# Patient Record
Sex: FEMALE | Race: WHITE | NOT HISPANIC OR LATINO | Employment: OTHER | URBAN - NONMETROPOLITAN AREA
[De-identification: names, ages, dates, MRNs, and addresses within clinical notes are randomized per-mention and may not be internally consistent; named-entity substitution may affect disease eponyms.]

---

## 2017-02-03 DIAGNOSIS — I48.0 PAF (PAROXYSMAL ATRIAL FIBRILLATION) (HCC): ICD-10-CM

## 2017-03-10 ENCOUNTER — OFFICE VISIT (OUTPATIENT)
Dept: CARDIOLOGY | Facility: CLINIC | Age: 62
End: 2017-03-10
Payer: COMMERCIAL

## 2017-03-10 VITALS
HEIGHT: 62 IN | WEIGHT: 137 LBS | HEART RATE: 70 BPM | SYSTOLIC BLOOD PRESSURE: 120 MMHG | BODY MASS INDEX: 25.21 KG/M2 | DIASTOLIC BLOOD PRESSURE: 70 MMHG

## 2017-03-10 DIAGNOSIS — E78.2 MIXED HYPERLIPIDEMIA: ICD-10-CM

## 2017-03-10 DIAGNOSIS — I48.92 PAROXYSMAL ATRIAL FLUTTER (HCC): ICD-10-CM

## 2017-03-10 DIAGNOSIS — I49.5 SICK SINUS SYNDROME (HCC): ICD-10-CM

## 2017-03-10 DIAGNOSIS — I27.20 PULMONARY HYPERTENSION (HCC): ICD-10-CM

## 2017-03-10 DIAGNOSIS — I48.92 ATRIAL FLUTTER, UNSPECIFIED TYPE (HCC): ICD-10-CM

## 2017-03-10 DIAGNOSIS — R07.2 PRECORDIAL CHEST PAIN: ICD-10-CM

## 2017-03-10 DIAGNOSIS — I49.5 TACHY-BRADY SYNDROME (HCC): ICD-10-CM

## 2017-03-10 DIAGNOSIS — Z95.0 CARDIAC PACEMAKER IN SITU: ICD-10-CM

## 2017-03-10 PROCEDURE — 99214 OFFICE O/P EST MOD 30 MIN: CPT | Performed by: INTERNAL MEDICINE

## 2017-03-10 ASSESSMENT — ENCOUNTER SYMPTOMS
COUGH: 0
GASTROINTESTINAL NEGATIVE: 1
SORE THROAT: 0
MUSCULOSKELETAL NEGATIVE: 1
SPUTUM PRODUCTION: 0
CARDIOVASCULAR NEGATIVE: 1
RESPIRATORY NEGATIVE: 1
PND: 0
NEUROLOGICAL NEGATIVE: 1
PALPITATIONS: 0
EYES NEGATIVE: 1
SHORTNESS OF BREATH: 0
WEAKNESS: 0
CHILLS: 0
FEVER: 0
WHEEZING: 0
BRUISES/BLEEDS EASILY: 0
STRIDOR: 0
CLAUDICATION: 0
ORTHOPNEA: 0
CONSTITUTIONAL NEGATIVE: 1
LOSS OF CONSCIOUSNESS: 0
DIZZINESS: 0
HEMOPTYSIS: 0

## 2017-03-10 NOTE — Clinical Note
[facility]   [address] [place] [address]  Phone: [phone]  Fax: [phone]              Feli Vuong  1955    Encounter Date: 3/10/2017    Alton Vogt M.D.          PROGRESS NOTE:  Subjective:   Feli Vuong is a 61 y.o. female who presents today for her atrial fibrillation.  Since her last visit, she has been doing well.  Her last PM interrogation was normal.  She has not been getting any SOB, palpitaions or chest pain.    Past Medical History   Diagnosis Date   • Sick sinus syndrome (CMS-[facility]) 2/2/2013   • Tachy-kamran syndrome (CMS-[facility]) 2/2/2013   • Diabetes mellitus type II 4/19/2013   • Bipolar affective (CMS-[facility]) 4/19/2013   • Chronic pain 4/19/2013   • Precordial chest pain 7/15/2013   • Cardiac pacemaker in situ 7/15/2013   • Atrial flutter (CMS-[facility]) 7/15/2013   • Pulmonary hypertension (CMS-[facility]) 5/16/2014     Mild on echo of 4/2014      Past Surgical History   Procedure Laterality Date   • Pacemaker insertion     • Hysterectomy, vaginal     • Bladder suspension       Family History   Problem Relation Age of Onset   • Heart Disease Mother    • Heart Attack Mother      History   Smoking status   • Never Smoker    Smokeless tobacco   • Never Used     Allergies   Allergen Reactions   • Septra [Bactrim]    • Tape    • Tegretol [Carbamazepine]    • Digoxin Vomiting     When taken with antibiotics       Outpatient Encounter Prescriptions as of 3/10/2017   Medication Sig Dispense Refill   • rivaroxaban (XARELTO) 20 MG Tab tablet Take 1 Tab by mouth with dinner. 30 Tab 11   • diltiazem (DILACOR XR) 180 MG XR capsule Take 1 Cap by mouth every day. 30 Cap 6   • flecainide (TAMBOCOR) 100 MG Tab Take 1 Tab by mouth 2 times a day. 180 Tab 3   • lisinopril (PRINIVIL) 5 MG Tab Take 0.5 Tabs by mouth every day. 45 Tab 3   • atorvastatin (LIPITOR) 40 MG TABS Take 40 mg by mouth every evening.     • hydrocodone-acetaminophen (NORCO) 5-325 MG TABS  "per tablet Take 1-2 Tabs by mouth every four hours as needed.     • albuterol (VENTOLIN OR PROVENTIL) 108 (90 BASE) MCG/ACT AERS inhalation aerosol Inhale 2 Puffs by mouth every 6 hours as needed for Shortness of Breath.     • tramadol (ULTRAM) 50 MG TABS Take  mg by mouth 3 times a day.     • Fluticasone Furoate (VERAMYST NA) Spray 2 Puffs in nose every day.     • Polyethylene Glycol 3350 (MIRALAX PO) Take  by mouth every day.     • digoxin (LANOXIN) 0.25 MG TABS Take 250 mcg by mouth every day.     • gabapentin (NEURONTIN) 800 MG tablet Take 800 mg by mouth 4 times a day.     • docusate calcium (SURFAK) 240 MG CAPS Take 240 mg by mouth 2 times a day.       • estradiol (ESTRACE) 1 MG TABS Take 1 mg by mouth every day. Dose not listed. Frequency is 23 duration      • clonazepam (KLONOPIN) 1 MG TABS Take 0.5 mg by mouth 3 times a day.       • insulin glargine (LANTUS) 100 UNIT/ML SOLN Inject  as instructed every evening.       • metformin (GLUCOPHAGE) 500 MG TABS Take 500 mg by mouth 2 times a day, with meals. Pt doesn't remember dosage. \" it doesn't work anyway'      • risperidone (RISPERDAL) 0.5 MG TABS Take 0.25 mg by mouth 2 times a day.         No facility-administered encounter medications on file as of 3/10/2017.     Review of Systems   Constitutional: Negative.  Negative for fever, chills and malaise/fatigue.   HENT: Negative.  Negative for sore throat.    Eyes: Negative.    Respiratory: Negative.  Negative for cough, hemoptysis, sputum production, shortness of breath, wheezing and stridor.    Cardiovascular: Negative.  Negative for chest pain, palpitations, orthopnea, claudication, leg swelling and PND.   Gastrointestinal: Negative.    Genitourinary: Negative.    Musculoskeletal: Negative.    Skin: Negative.    Neurological: Negative.  Negative for dizziness, loss of consciousness and weakness.   Endo/Heme/Allergies: Negative.  Does not bruise/bleed easily.   All other systems reviewed and are " "negative.       Objective:   /70 mmHg  Pulse 70  Ht 1.575 m (5' 2\")  Wt 62.143 kg (137 lb)  BMI 25.05 kg/m2    Physical Exam   Constitutional: She is oriented to person, place, and time. She appears well-developed and well-nourished. No distress.   HENT:   Head: Normocephalic.   Mouth/Throat: Oropharynx is clear and moist.   Eyes: EOM are normal. Pupils are equal, round, and reactive to light. Right eye exhibits no discharge. Left eye exhibits no discharge. No scleral icterus.   Neck: Normal range of motion. Neck supple. No JVD present. No tracheal deviation present.   Cardiovascular: Normal rate, regular rhythm, S1 normal, S2 normal, normal heart sounds, intact distal pulses and normal pulses.  Exam reveals no gallop, no S3, no S4 and no friction rub.    No murmur heard.   No systolic murmur is present    No diastolic murmur is present   Pulses:       Carotid pulses are 2+ on the right side, and 2+ on the left side.       Radial pulses are 2+ on the right side, and 2+ on the left side.        Dorsalis pedis pulses are 2+ on the right side, and 2+ on the left side.        Posterior tibial pulses are 2+ on the right side, and 2+ on the left side.   Pulmonary/Chest: Effort normal and breath sounds normal. No respiratory distress. She has no wheezes. She has no rales.   Abdominal: Soft. Bowel sounds are normal. She exhibits no distension and no mass. There is no tenderness. There is no rebound and no guarding.   Musculoskeletal: She exhibits no edema.   Neurological: She is alert and oriented to person, place, and time. No cranial nerve deficit.   Skin: Skin is warm and dry. She is not diaphoretic. No pallor.   Psychiatric: She has a normal mood and affect. Her behavior is normal. Judgment and thought content normal.   Nursing note and vitals reviewed.      Assessment:     1. Sick sinus syndrome (CMS-HCC)     2. Tachy-kamran syndrome (CMS-HCC)     3. Precordial chest pain     4. Cardiac pacemaker in situ     "   5. Atrial flutter, unspecified type (CMS-HCC)     6. Pulmonary hypertension (CMS-HCC)     7. Mixed hyperlipidemia     8. Paroxysmal atrial flutter (CMS-HCC)         Medical Decision Making:  Today's Assessment / Status / Plan:     61 y/o F with atrial fibrillation, low burden, asymptomatic.  She continues to do well.  No changes today.  I will see her back in  6 months.    1. A-fib, paroxysmal    - low burden    - cont rivaroxaban 20    - cont dilt     - cont flecainde 100 bid    - cont dig 0.25       2. HTN    - cont lisinopril 2.5    3. HLD    - cont atorva 40    Thank for you allowing me to take part in your patient's care, please call should you have any questions or would like to discuss this patient.      Camilo Fish M.D.  [address] [facility] [address]  VIA Facsimile: [phone]

## 2017-03-10 NOTE — PROGRESS NOTES
Subjective:   Feli Vuong is a 61 y.o. female who presents today for her atrial fibrillation.  Since her last visit, she has been doing well.  Her last PM interrogation was normal.  She has not been getting any SOB, palpitaions or chest pain.    Past Medical History   Diagnosis Date   • Sick sinus syndrome (CMS-[facility]) 2/2/2013   • Tachy-kamran syndrome (CMS-[facility]) 2/2/2013   • Diabetes mellitus type II 4/19/2013   • Bipolar affective (CMS-[facility]) 4/19/2013   • Chronic pain 4/19/2013   • Precordial chest pain 7/15/2013   • Cardiac pacemaker in situ 7/15/2013   • Atrial flutter (CMS-[facility]) 7/15/2013   • Pulmonary hypertension (CMS-HCC) 5/16/2014     Mild on echo of 4/2014      Past Surgical History   Procedure Laterality Date   • Pacemaker insertion     • Hysterectomy, vaginal     • Bladder suspension       Family History   Problem Relation Age of Onset   • Heart Disease Mother    • Heart Attack Mother      History   Smoking status   • Never Smoker    Smokeless tobacco   • Never Used     Allergies   Allergen Reactions   • Septra [Bactrim]    • Tape    • Tegretol [Carbamazepine]    • Digoxin Vomiting     When taken with antibiotics       Outpatient Encounter Prescriptions as of 3/10/2017   Medication Sig Dispense Refill   • rivaroxaban (XARELTO) 20 MG Tab tablet Take 1 Tab by mouth with dinner. 30 Tab 11   • diltiazem (DILACOR XR) 180 MG XR capsule Take 1 Cap by mouth every day. 30 Cap 6   • flecainide (TAMBOCOR) 100 MG Tab Take 1 Tab by mouth 2 times a day. 180 Tab 3   • lisinopril (PRINIVIL) 5 MG Tab Take 0.5 Tabs by mouth every day. 45 Tab 3   • atorvastatin (LIPITOR) 40 MG TABS Take 40 mg by mouth every evening.     • hydrocodone-acetaminophen (NORCO) 5-325 MG TABS per tablet Take 1-2 Tabs by mouth every four hours as needed.     • albuterol (VENTOLIN OR PROVENTIL) 108 (90 BASE) MCG/ACT AERS inhalation aerosol Inhale 2 Puffs by mouth every 6 hours as needed for Shortness of Breath.     • tramadol (ULTRAM) 50 MG TABS Take  " mg by mouth 3 times a day.     • Fluticasone Furoate (VERAMYST NA) Spray 2 Puffs in nose every day.     • Polyethylene Glycol 3350 (MIRALAX PO) Take  by mouth every day.     • digoxin (LANOXIN) 0.25 MG TABS Take 250 mcg by mouth every day.     • gabapentin (NEURONTIN) 800 MG tablet Take 800 mg by mouth 4 times a day.     • docusate calcium (SURFAK) 240 MG CAPS Take 240 mg by mouth 2 times a day.       • estradiol (ESTRACE) 1 MG TABS Take 1 mg by mouth every day. Dose not listed. Frequency is 23 duration      • clonazepam (KLONOPIN) 1 MG TABS Take 0.5 mg by mouth 3 times a day.       • insulin glargine (LANTUS) 100 UNIT/ML SOLN Inject  as instructed every evening.       • metformin (GLUCOPHAGE) 500 MG TABS Take 500 mg by mouth 2 times a day, with meals. Pt doesn't remember dosage. \" it doesn't work anyway'      • risperidone (RISPERDAL) 0.5 MG TABS Take 0.25 mg by mouth 2 times a day.         No facility-administered encounter medications on file as of 3/10/2017.     Review of Systems   Constitutional: Negative.  Negative for fever, chills and malaise/fatigue.   HENT: Negative.  Negative for sore throat.    Eyes: Negative.    Respiratory: Negative.  Negative for cough, hemoptysis, sputum production, shortness of breath, wheezing and stridor.    Cardiovascular: Negative.  Negative for chest pain, palpitations, orthopnea, claudication, leg swelling and PND.   Gastrointestinal: Negative.    Genitourinary: Negative.    Musculoskeletal: Negative.    Skin: Negative.    Neurological: Negative.  Negative for dizziness, loss of consciousness and weakness.   Endo/Heme/Allergies: Negative.  Does not bruise/bleed easily.   All other systems reviewed and are negative.       Objective:   /70 mmHg  Pulse 70  Ht 1.575 m (5' 2\")  Wt 62.143 kg (137 lb)  BMI 25.05 kg/m2    Physical Exam   Constitutional: She is oriented to person, place, and time. She appears well-developed and well-nourished. No distress.   HENT: "   Head: Normocephalic.   Mouth/Throat: Oropharynx is clear and moist.   Eyes: EOM are normal. Pupils are equal, round, and reactive to light. Right eye exhibits no discharge. Left eye exhibits no discharge. No scleral icterus.   Neck: Normal range of motion. Neck supple. No JVD present. No tracheal deviation present.   Cardiovascular: Normal rate, regular rhythm, S1 normal, S2 normal, normal heart sounds, intact distal pulses and normal pulses.  Exam reveals no gallop, no S3, no S4 and no friction rub.    No murmur heard.   No systolic murmur is present    No diastolic murmur is present   Pulses:       Carotid pulses are 2+ on the right side, and 2+ on the left side.       Radial pulses are 2+ on the right side, and 2+ on the left side.        Dorsalis pedis pulses are 2+ on the right side, and 2+ on the left side.        Posterior tibial pulses are 2+ on the right side, and 2+ on the left side.   Pulmonary/Chest: Effort normal and breath sounds normal. No respiratory distress. She has no wheezes. She has no rales.   Abdominal: Soft. Bowel sounds are normal. She exhibits no distension and no mass. There is no tenderness. There is no rebound and no guarding.   Musculoskeletal: She exhibits no edema.   Neurological: She is alert and oriented to person, place, and time. No cranial nerve deficit.   Skin: Skin is warm and dry. She is not diaphoretic. No pallor.   Psychiatric: She has a normal mood and affect. Her behavior is normal. Judgment and thought content normal.   Nursing note and vitals reviewed.      Assessment:     1. Sick sinus syndrome (CMS-HCC)     2. Tachy-kamran syndrome (CMS-HCC)     3. Precordial chest pain     4. Cardiac pacemaker in situ     5. Atrial flutter, unspecified type (CMS-HCC)     6. Pulmonary hypertension (CMS-HCC)     7. Mixed hyperlipidemia     8. Paroxysmal atrial flutter (CMS-HCC)         Medical Decision Making:  Today's Assessment / Status / Plan:     59 y/o F with atrial fibrillation,  low burden, asymptomatic.  She continues to do well.  No changes today.  I will see her back in  6 months.    1. A-fib, paroxysmal    - low burden    - cont rivaroxaban 20    - cont dilt     - cont flecainde 100 bid    - cont dig 0.25       2. HTN    - cont lisinopril 2.5    3. HLD    - cont atorva 40    Thank for you allowing me to take part in your patient's care, please call should you have any questions or would like to discuss this patient.

## 2017-03-31 DIAGNOSIS — I48.0 PAF (PAROXYSMAL ATRIAL FIBRILLATION) (HCC): ICD-10-CM

## 2017-03-31 RX ORDER — DILTIAZEM HYDROCHLORIDE 180 MG/1
180 CAPSULE, EXTENDED RELEASE ORAL DAILY
Qty: 30 CAP | Refills: 6 | OUTPATIENT
Start: 2017-03-31 | End: 2017-10-09 | Stop reason: SDUPTHER

## 2017-05-26 ENCOUNTER — DEVICE CHECK (OUTPATIENT)
Dept: CARDIOLOGY | Facility: MEDICAL CENTER | Age: 62
End: 2017-05-26

## 2017-06-26 DIAGNOSIS — I10 ESSENTIAL HYPERTENSION, BENIGN: ICD-10-CM

## 2017-06-26 RX ORDER — LISINOPRIL 5 MG/1
2.5 TABLET ORAL DAILY
Qty: 45 TAB | Refills: 3 | Status: SHIPPED | OUTPATIENT
Start: 2017-06-26 | End: 2018-06-28 | Stop reason: SDUPTHER

## 2017-07-14 DIAGNOSIS — I49.5 TACHY-BRADY SYNDROME (HCC): ICD-10-CM

## 2017-07-14 RX ORDER — FLECAINIDE ACETATE 100 MG/1
100 TABLET ORAL 2 TIMES DAILY
Qty: 180 TAB | Refills: 3 | Status: SHIPPED | OUTPATIENT
Start: 2017-07-14 | End: 2018-07-26 | Stop reason: SDUPTHER

## 2017-09-22 ENCOUNTER — OFFICE VISIT (OUTPATIENT)
Dept: CARDIOLOGY | Facility: CLINIC | Age: 62
End: 2017-09-22
Payer: COMMERCIAL

## 2017-09-22 VITALS
HEIGHT: 62 IN | HEART RATE: 74 BPM | WEIGHT: 133 LBS | BODY MASS INDEX: 24.48 KG/M2 | SYSTOLIC BLOOD PRESSURE: 124 MMHG | DIASTOLIC BLOOD PRESSURE: 72 MMHG

## 2017-09-22 DIAGNOSIS — I48.92 ATRIAL FLUTTER, UNSPECIFIED TYPE (HCC): ICD-10-CM

## 2017-09-22 DIAGNOSIS — I34.0 MILD MITRAL REGURGITATION: ICD-10-CM

## 2017-09-22 DIAGNOSIS — I35.8 AORTIC VALVE SCLEROSIS: ICD-10-CM

## 2017-09-22 DIAGNOSIS — E78.2 MIXED HYPERLIPIDEMIA: ICD-10-CM

## 2017-09-22 DIAGNOSIS — I49.5 SICK SINUS SYNDROME (HCC): ICD-10-CM

## 2017-09-22 DIAGNOSIS — Z95.0 CARDIAC PACEMAKER IN SITU: ICD-10-CM

## 2017-09-22 DIAGNOSIS — I48.92 PAROXYSMAL ATRIAL FLUTTER (HCC): ICD-10-CM

## 2017-09-22 DIAGNOSIS — I49.5 TACHY-BRADY SYNDROME (HCC): ICD-10-CM

## 2017-09-22 DIAGNOSIS — F31.70 BIPOLAR DISORDER IN PARTIAL REMISSION, MOST RECENT EPISODE UNSPECIFIED TYPE (HCC): ICD-10-CM

## 2017-09-22 DIAGNOSIS — I27.20 PULMONARY HYPERTENSION (HCC): ICD-10-CM

## 2017-09-22 PROCEDURE — 99214 OFFICE O/P EST MOD 30 MIN: CPT | Performed by: INTERNAL MEDICINE

## 2017-09-22 NOTE — LETTER
[facility] -    [address] [place] [address]  Phone: [phone]  Fax: [phone]              Feli Vuong  1955    Encounter Date: 9/22/2017    Alton Vogt M.D.          PROGRESS NOTE:  No notes on file      [facility]ipa Vera [facility]  [address] [place] [address]  VIA Facsimile: [phone]

## 2017-09-24 ASSESSMENT — ENCOUNTER SYMPTOMS
COUGH: 0
PND: 0
RESPIRATORY NEGATIVE: 1
CARDIOVASCULAR NEGATIVE: 1
SPUTUM PRODUCTION: 0
STRIDOR: 0
GASTROINTESTINAL NEGATIVE: 1
PALPITATIONS: 0
SORE THROAT: 0
HEMOPTYSIS: 0
CHILLS: 0
DIZZINESS: 0
BRUISES/BLEEDS EASILY: 0
WHEEZING: 0
CLAUDICATION: 0
NEUROLOGICAL NEGATIVE: 1
MUSCULOSKELETAL NEGATIVE: 1
CONSTITUTIONAL NEGATIVE: 1
ORTHOPNEA: 0
SHORTNESS OF BREATH: 0
LOSS OF CONSCIOUSNESS: 0
EYES NEGATIVE: 1
FEVER: 0
WEAKNESS: 0

## 2017-09-25 NOTE — PROGRESS NOTES
Subjective:   Feli Vuong is a 61 y.o. female who presents today as a follow up for her paroxysmal atrial fibrillation and her PPM.  She continues to do well with no symptoms.  A-fib is rare.  She is trying to be active.    Past Medical History:   Diagnosis Date   • Pulmonary hypertension (CMS-[facility]) 5/16/2014    Mild on echo of 4/2014    • Precordial chest pain 7/15/2013   • Cardiac pacemaker in situ 7/15/2013   • Atrial flutter (CMS-[facility]) 7/15/2013   • Diabetes mellitus type II 4/19/2013   • Bipolar affective (CMS-[facility]) 4/19/2013   • Chronic pain 4/19/2013   • Sick sinus syndrome (CMS-[facility]) 2/2/2013   • Tachy-kamran syndrome (CMS-[facility]) 2/2/2013     Past Surgical History:   Procedure Laterality Date   • BLADDER SUSPENSION     • HYSTERECTOMY, VAGINAL     • PACEMAKER INSERTION       Family History   Problem Relation Age of Onset   • Heart Disease Mother    • Heart Attack Mother      History   Smoking Status   • Never Smoker   Smokeless Tobacco   • Never Used     Allergies   Allergen Reactions   • Septra [Bactrim]    • Tape    • Tegretol [Carbamazepine]    • Digoxin Vomiting     When taken with antibiotics       Outpatient Encounter Prescriptions as of 9/22/2017   Medication Sig Dispense Refill   • flecainide (TAMBOCOR) 100 MG Tab Take 1 Tab by mouth 2 times a day. 180 Tab 3   • lisinopril (PRINIVIL) 5 MG Tab Take 0.5 Tabs by mouth every day. 45 Tab 3   • diltiazem (DILACOR XR) 180 MG XR capsule Take 1 Cap by mouth every day. 30 Cap 6   • rivaroxaban (XARELTO) 20 MG Tab tablet Take 1 Tab by mouth with dinner. 30 Tab 11   • atorvastatin (LIPITOR) 40 MG TABS Take 40 mg by mouth every evening.     • hydrocodone-acetaminophen (NORCO) 5-325 MG TABS per tablet Take 1-2 Tabs by mouth every four hours as needed.     • albuterol (VENTOLIN OR PROVENTIL) 108 (90 BASE) MCG/ACT AERS inhalation aerosol Inhale 2 Puffs by mouth every 6 hours as needed for Shortness of Breath.     • tramadol (ULTRAM) 50 MG TABS Take  mg by mouth 3 times  "a day.     • Fluticasone Furoate (VERAMYST NA) Spray 2 Puffs in nose every day.     • Polyethylene Glycol 3350 (MIRALAX PO) Take  by mouth every day.     • digoxin (LANOXIN) 0.25 MG TABS Take 250 mcg by mouth every day.     • gabapentin (NEURONTIN) 800 MG tablet Take 800 mg by mouth 4 times a day.     • docusate calcium (SURFAK) 240 MG CAPS Take 240 mg by mouth 2 times a day.       • estradiol (ESTRACE) 1 MG TABS Take 1 mg by mouth every day. Dose not listed. Frequency is 23 duration      • clonazepam (KLONOPIN) 1 MG TABS Take 0.5 mg by mouth 3 times a day.       • insulin glargine (LANTUS) 100 UNIT/ML SOLN Inject  as instructed every evening.       • metformin (GLUCOPHAGE) 500 MG TABS Take 500 mg by mouth 2 times a day, with meals. Pt doesn't remember dosage. \" it doesn't work anyway'      • risperidone (RISPERDAL) 0.5 MG TABS Take 0.25 mg by mouth 2 times a day.         No facility-administered encounter medications on file as of 9/22/2017.      Review of Systems   Constitutional: Negative.  Negative for chills, fever and malaise/fatigue.   HENT: Negative.  Negative for sore throat.    Eyes: Negative.    Respiratory: Negative.  Negative for cough, hemoptysis, sputum production, shortness of breath, wheezing and stridor.    Cardiovascular: Negative.  Negative for chest pain, palpitations, orthopnea, claudication, leg swelling and PND.   Gastrointestinal: Negative.    Genitourinary: Negative.    Musculoskeletal: Negative.    Skin: Negative.    Neurological: Negative.  Negative for dizziness, loss of consciousness and weakness.   Endo/Heme/Allergies: Negative.  Does not bruise/bleed easily.   All other systems reviewed and are negative.       Objective:   /72   Pulse 74   Ht 1.575 m (5' 2\")   Wt 60.3 kg (133 lb)   BMI 24.33 kg/m²     Physical Exam   Constitutional: She is oriented to person, place, and time. She appears well-developed and well-nourished. No distress.   HENT:   Head: Normocephalic. "   Mouth/Throat: Oropharynx is clear and moist.   Eyes: EOM are normal. Pupils are equal, round, and reactive to light. Right eye exhibits no discharge. Left eye exhibits no discharge. No scleral icterus.   Neck: Normal range of motion. Neck supple. No JVD present. No tracheal deviation present.   Cardiovascular: Normal rate, regular rhythm, S1 normal, S2 normal, normal heart sounds, intact distal pulses and normal pulses.  Exam reveals no gallop, no S3, no S4 and no friction rub.    No murmur heard.   No systolic murmur is present    No diastolic murmur is present   Pulses:       Carotid pulses are 2+ on the right side, and 2+ on the left side.       Radial pulses are 2+ on the right side, and 2+ on the left side.        Dorsalis pedis pulses are 2+ on the right side, and 2+ on the left side.        Posterior tibial pulses are 2+ on the right side, and 2+ on the left side.   Pulmonary/Chest: Effort normal and breath sounds normal. No respiratory distress. She has no wheezes. She has no rales.   Abdominal: Soft. Bowel sounds are normal. She exhibits no distension and no mass. There is no tenderness. There is no rebound and no guarding.   Musculoskeletal: She exhibits no edema.   Neurological: She is alert and oriented to person, place, and time. No cranial nerve deficit.   Skin: Skin is warm and dry. She is not diaphoretic. No pallor.   Psychiatric: She has a normal mood and affect. Her behavior is normal. Judgment and thought content normal.   Nursing note and vitals reviewed.      Assessment:     1. Sick sinus syndrome (CMS-HCC)     2. Tachy-kamran syndrome (CMS-HCC)     3. Bipolar disorder in partial remission, most recent episode unspecified type (CMS-HCC)     4. Cardiac pacemaker in situ     5. Atrial flutter, unspecified type (CMS-HCC)     6. Aortic valve sclerosis     7. Mild mitral regurgitation     8. Pulmonary hypertension (CMS-HCC)     9. Mixed hyperlipidemia     10. Paroxysmal atrial flutter (CMS-HCC)          Medical Decision Making:  Today's Assessment / Status / Plan:     60 y/o F with paroxysmal atrial fibrillation s/p PPM, HTN and HLD.  She is doing well.  We will refill her medications today.  We will see her back in one year.    Thank for you allowing me to take part in your patient's care, please call should you have any questions or would like to discuss this patient.

## 2017-10-09 DIAGNOSIS — I48.0 PAF (PAROXYSMAL ATRIAL FIBRILLATION) (HCC): ICD-10-CM

## 2017-10-10 RX ORDER — DILTIAZEM HYDROCHLORIDE 180 MG/1
180 CAPSULE, EXTENDED RELEASE ORAL DAILY
Qty: 90 CAP | Refills: 3 | Status: SHIPPED | OUTPATIENT
Start: 2017-10-10 | End: 2022-08-26

## 2017-11-14 ENCOUNTER — TELEPHONE (OUTPATIENT)
Dept: CARDIOLOGY | Facility: MEDICAL CENTER | Age: 62
End: 2017-11-14

## 2018-02-16 DIAGNOSIS — I48.0 PAF (PAROXYSMAL ATRIAL FIBRILLATION) (HCC): ICD-10-CM

## 2018-02-16 RX ORDER — RIVAROXABAN 20 MG/1
TABLET, FILM COATED ORAL
Qty: 90 TAB | Refills: 2 | Status: SHIPPED | OUTPATIENT
Start: 2018-02-16 | End: 2018-12-07 | Stop reason: SDUPTHER

## 2018-06-28 DIAGNOSIS — I10 ESSENTIAL HYPERTENSION, BENIGN: ICD-10-CM

## 2018-06-28 RX ORDER — LISINOPRIL 5 MG/1
TABLET ORAL
Qty: 30 TAB | Refills: 5 | Status: SHIPPED | OUTPATIENT
Start: 2018-06-28 | End: 2019-01-04 | Stop reason: SDUPTHER

## 2018-07-05 DIAGNOSIS — I49.5 SICK SINUS SYNDROME (HCC): ICD-10-CM

## 2018-07-05 DIAGNOSIS — I49.5 TACHY-BRADY SYNDROME (HCC): ICD-10-CM

## 2018-07-05 DIAGNOSIS — I48.92 PAROXYSMAL ATRIAL FLUTTER (HCC): ICD-10-CM

## 2018-07-26 DIAGNOSIS — I49.5 TACHY-BRADY SYNDROME (HCC): ICD-10-CM

## 2018-07-26 RX ORDER — FLECAINIDE ACETATE 100 MG/1
TABLET ORAL
Qty: 180 TAB | Refills: 1 | Status: ON HOLD | OUTPATIENT
Start: 2018-07-26 | End: 2022-09-01

## 2018-07-31 ENCOUNTER — TELEPHONE (OUTPATIENT)
Dept: CARDIOLOGY | Facility: MEDICAL CENTER | Age: 63
End: 2018-07-31

## 2018-07-31 NOTE — TELEPHONE ENCOUNTER
Patient wants call back   Received: Today   Message Contents   JYOTI Vigil/Nellie     Patient wants a call back about a release form to the [facility] center and can be reached at [phone].     Attempted to contact patient, patient picked up, bad connection, phone cutting in and out.  Call ended.     2nd attempt to contact patient, bad connection, patient ended phone call.    3rd attempt, no answer.  Went to .   not set up yet.  Unable to leave message.

## 2018-11-09 DIAGNOSIS — I49.5 TACHY-BRADY SYNDROME (HCC): Primary | ICD-10-CM

## 2018-11-11 RX ORDER — DILTIAZEM HYDROCHLORIDE 180 MG/1
CAPSULE, EXTENDED RELEASE ORAL
Qty: 90 CAP | Refills: 0 | Status: SHIPPED | OUTPATIENT
Start: 2018-11-11 | End: 2022-08-26

## 2018-12-07 DIAGNOSIS — I48.0 PAF (PAROXYSMAL ATRIAL FIBRILLATION) (HCC): ICD-10-CM

## 2018-12-07 RX ORDER — RIVAROXABAN 20 MG/1
TABLET, FILM COATED ORAL
Qty: 90 TAB | Refills: 2 | Status: SHIPPED | OUTPATIENT
Start: 2018-12-07

## 2019-01-04 DIAGNOSIS — I10 ESSENTIAL HYPERTENSION, BENIGN: ICD-10-CM

## 2019-01-04 RX ORDER — LISINOPRIL 5 MG/1
2.5 TABLET ORAL DAILY
Qty: 15 TAB | Refills: 0 | Status: SHIPPED | OUTPATIENT
Start: 2019-01-04 | End: 2022-08-26

## 2022-08-26 ENCOUNTER — HOSPITAL ENCOUNTER (OUTPATIENT)
Dept: RADIOLOGY | Facility: MEDICAL CENTER | Age: 67
End: 2022-08-26

## 2022-08-26 ENCOUNTER — TELEPHONE (OUTPATIENT)
Dept: CARDIOLOGY | Facility: MEDICAL CENTER | Age: 67
End: 2022-08-26
Payer: MEDICARE

## 2022-08-26 ENCOUNTER — APPOINTMENT (OUTPATIENT)
Dept: RADIOLOGY | Facility: MEDICAL CENTER | Age: 67
DRG: 871 | End: 2022-08-26
Attending: EMERGENCY MEDICINE
Payer: MEDICARE

## 2022-08-26 ENCOUNTER — HOSPITAL ENCOUNTER (INPATIENT)
Facility: MEDICAL CENTER | Age: 67
LOS: 5 days | DRG: 871 | End: 2022-09-01
Attending: EMERGENCY MEDICINE | Admitting: STUDENT IN AN ORGANIZED HEALTH CARE EDUCATION/TRAINING PROGRAM
Payer: MEDICARE

## 2022-08-26 DIAGNOSIS — Z79.4 TYPE 2 DIABETES MELLITUS WITH HYPOGLYCEMIA WITHOUT COMA, WITH LONG-TERM CURRENT USE OF INSULIN (HCC): ICD-10-CM

## 2022-08-26 DIAGNOSIS — E11.649 TYPE 2 DIABETES MELLITUS WITH HYPOGLYCEMIA WITHOUT COMA, WITH LONG-TERM CURRENT USE OF INSULIN (HCC): ICD-10-CM

## 2022-08-26 DIAGNOSIS — G45.9 TIA (TRANSIENT ISCHEMIC ATTACK): ICD-10-CM

## 2022-08-26 DIAGNOSIS — E78.2 MIXED HYPERLIPIDEMIA: ICD-10-CM

## 2022-08-26 DIAGNOSIS — R41.82 ALTERED MENTAL STATUS, UNSPECIFIED ALTERED MENTAL STATUS TYPE: ICD-10-CM

## 2022-08-26 PROBLEM — D72.829 LEUKOCYTOSIS: Status: ACTIVE | Noted: 2022-08-26

## 2022-08-26 PROBLEM — R55 SYNCOPE: Status: ACTIVE | Noted: 2022-08-26

## 2022-08-26 PROBLEM — N17.9 AKI (ACUTE KIDNEY INJURY) (HCC): Status: ACTIVE | Noted: 2022-08-26

## 2022-08-26 PROBLEM — R07.9 PAIN IN THE CHEST: Status: ACTIVE | Noted: 2022-08-26

## 2022-08-26 PROBLEM — R79.89 ELEVATED TROPONIN: Status: ACTIVE | Noted: 2022-08-26

## 2022-08-26 PROBLEM — E87.6 HYPOKALEMIA: Status: ACTIVE | Noted: 2022-08-26

## 2022-08-26 LAB
ALBUMIN SERPL BCP-MCNC: 3.7 G/DL (ref 3.2–4.9)
ALBUMIN/GLOB SERPL: 1.1 G/DL
ALP SERPL-CCNC: 74 U/L (ref 30–99)
ALT SERPL-CCNC: 16 U/L (ref 2–50)
ANION GAP SERPL CALC-SCNC: 13 MMOL/L (ref 7–16)
APTT PPP: 26.4 SEC (ref 24.7–36)
AST SERPL-CCNC: 17 U/L (ref 12–45)
BASOPHILS # BLD AUTO: 0.2 % (ref 0–1.8)
BASOPHILS # BLD: 0.03 K/UL (ref 0–0.12)
BILIRUB SERPL-MCNC: 0.3 MG/DL (ref 0.1–1.5)
BUN SERPL-MCNC: 29 MG/DL (ref 8–22)
CALCIUM SERPL-MCNC: 10.1 MG/DL (ref 8.5–10.5)
CHLORIDE SERPL-SCNC: 108 MMOL/L (ref 96–112)
CO2 SERPL-SCNC: 18 MMOL/L (ref 20–33)
CREAT SERPL-MCNC: 1.46 MG/DL (ref 0.5–1.4)
EKG IMPRESSION: NORMAL
EOSINOPHIL # BLD AUTO: 0.07 K/UL (ref 0–0.51)
EOSINOPHIL NFR BLD: 0.5 % (ref 0–6.9)
ERYTHROCYTE [DISTWIDTH] IN BLOOD BY AUTOMATED COUNT: 39.8 FL (ref 35.9–50)
ETHANOL BLD-MCNC: <10.1 MG/DL
GFR SERPLBLD CREATININE-BSD FMLA CKD-EPI: 39 ML/MIN/1.73 M 2
GLOBULIN SER CALC-MCNC: 3.3 G/DL (ref 1.9–3.5)
GLUCOSE SERPL-MCNC: 112 MG/DL (ref 65–99)
HCT VFR BLD AUTO: 39.8 % (ref 37–47)
HGB BLD-MCNC: 13.9 G/DL (ref 12–16)
IMM GRANULOCYTES # BLD AUTO: 0.05 K/UL (ref 0–0.11)
IMM GRANULOCYTES NFR BLD AUTO: 0.4 % (ref 0–0.9)
INR PPP: 1 (ref 0.87–1.13)
LIPASE SERPL-CCNC: 19 U/L (ref 11–82)
LYMPHOCYTES # BLD AUTO: 2.52 K/UL (ref 1–4.8)
LYMPHOCYTES NFR BLD: 19.3 % (ref 22–41)
MAGNESIUM SERPL-MCNC: 1.7 MG/DL (ref 1.5–2.5)
MCH RBC QN AUTO: 30 PG (ref 27–33)
MCHC RBC AUTO-ENTMCNC: 34.9 G/DL (ref 33.6–35)
MCV RBC AUTO: 85.8 FL (ref 81.4–97.8)
MONOCYTES # BLD AUTO: 0.64 K/UL (ref 0–0.85)
MONOCYTES NFR BLD AUTO: 4.9 % (ref 0–13.4)
NEUTROPHILS # BLD AUTO: 9.75 K/UL (ref 2–7.15)
NEUTROPHILS NFR BLD: 74.7 % (ref 44–72)
NRBC # BLD AUTO: 0 K/UL
NRBC BLD-RTO: 0 /100 WBC
NT-PROBNP SERPL IA-MCNC: 787 PG/ML (ref 0–125)
PHOSPHATE SERPL-MCNC: 3.7 MG/DL (ref 2.5–4.5)
PLATELET # BLD AUTO: 362 K/UL (ref 164–446)
PMV BLD AUTO: 9.3 FL (ref 9–12.9)
POTASSIUM SERPL-SCNC: 3.2 MMOL/L (ref 3.6–5.5)
PROCALCITONIN SERPL-MCNC: 0.23 NG/ML
PROT SERPL-MCNC: 7 G/DL (ref 6–8.2)
PROTHROMBIN TIME: 13.1 SEC (ref 12–14.6)
RBC # BLD AUTO: 4.64 M/UL (ref 4.2–5.4)
SODIUM SERPL-SCNC: 139 MMOL/L (ref 135–145)
TROPONIN T SERPL-MCNC: 21 NG/L (ref 6–19)
TROPONIN T SERPL-MCNC: 22 NG/L (ref 6–19)
WBC # BLD AUTO: 13.1 K/UL (ref 4.8–10.8)

## 2022-08-26 PROCEDURE — 84145 PROCALCITONIN (PCT): CPT

## 2022-08-26 PROCEDURE — 700105 HCHG RX REV CODE 258: Performed by: STUDENT IN AN ORGANIZED HEALTH CARE EDUCATION/TRAINING PROGRAM

## 2022-08-26 PROCEDURE — 99285 EMERGENCY DEPT VISIT HI MDM: CPT

## 2022-08-26 PROCEDURE — 80053 COMPREHEN METABOLIC PANEL: CPT

## 2022-08-26 PROCEDURE — 99220 PR INITIAL OBSERVATION CARE,LEVL III: CPT | Performed by: STUDENT IN AN ORGANIZED HEALTH CARE EDUCATION/TRAINING PROGRAM

## 2022-08-26 PROCEDURE — 82077 ASSAY SPEC XCP UR&BREATH IA: CPT

## 2022-08-26 PROCEDURE — 84484 ASSAY OF TROPONIN QUANT: CPT | Mod: 91

## 2022-08-26 PROCEDURE — G0378 HOSPITAL OBSERVATION PER HR: HCPCS

## 2022-08-26 PROCEDURE — 84100 ASSAY OF PHOSPHORUS: CPT

## 2022-08-26 PROCEDURE — 85730 THROMBOPLASTIN TIME PARTIAL: CPT

## 2022-08-26 PROCEDURE — 36415 COLL VENOUS BLD VENIPUNCTURE: CPT

## 2022-08-26 PROCEDURE — 83690 ASSAY OF LIPASE: CPT

## 2022-08-26 PROCEDURE — 81001 URINALYSIS AUTO W/SCOPE: CPT

## 2022-08-26 PROCEDURE — 93005 ELECTROCARDIOGRAM TRACING: CPT | Performed by: EMERGENCY MEDICINE

## 2022-08-26 PROCEDURE — 85025 COMPLETE CBC W/AUTO DIFF WBC: CPT

## 2022-08-26 PROCEDURE — 99204 OFFICE O/P NEW MOD 45 MIN: CPT | Performed by: STUDENT IN AN ORGANIZED HEALTH CARE EDUCATION/TRAINING PROGRAM

## 2022-08-26 PROCEDURE — 85610 PROTHROMBIN TIME: CPT

## 2022-08-26 PROCEDURE — A9270 NON-COVERED ITEM OR SERVICE: HCPCS | Performed by: STUDENT IN AN ORGANIZED HEALTH CARE EDUCATION/TRAINING PROGRAM

## 2022-08-26 PROCEDURE — 83880 ASSAY OF NATRIURETIC PEPTIDE: CPT

## 2022-08-26 PROCEDURE — 93005 ELECTROCARDIOGRAM TRACING: CPT

## 2022-08-26 PROCEDURE — 700102 HCHG RX REV CODE 250 W/ 637 OVERRIDE(OP): Performed by: STUDENT IN AN ORGANIZED HEALTH CARE EDUCATION/TRAINING PROGRAM

## 2022-08-26 PROCEDURE — 71045 X-RAY EXAM CHEST 1 VIEW: CPT

## 2022-08-26 PROCEDURE — 83735 ASSAY OF MAGNESIUM: CPT

## 2022-08-26 RX ORDER — METOPROLOL SUCCINATE 100 MG/1
100 TABLET, EXTENDED RELEASE ORAL DAILY
COMMUNITY

## 2022-08-26 RX ORDER — QUETIAPINE FUMARATE 25 MG/1
25 TABLET, FILM COATED ORAL 2 TIMES DAILY
Status: ON HOLD | COMMUNITY
End: 2022-09-01

## 2022-08-26 RX ORDER — RISPERIDONE 1 MG/1
1 TABLET ORAL
Status: ON HOLD | COMMUNITY
End: 2022-09-01

## 2022-08-26 RX ORDER — SODIUM CHLORIDE, SODIUM LACTATE, POTASSIUM CHLORIDE, AND CALCIUM CHLORIDE .6; .31; .03; .02 G/100ML; G/100ML; G/100ML; G/100ML
1000 INJECTION, SOLUTION INTRAVENOUS ONCE
Status: COMPLETED | OUTPATIENT
Start: 2022-08-27 | End: 2022-08-27

## 2022-08-26 RX ORDER — SODIUM CHLORIDE, SODIUM LACTATE, POTASSIUM CHLORIDE, CALCIUM CHLORIDE 600; 310; 30; 20 MG/100ML; MG/100ML; MG/100ML; MG/100ML
INJECTION, SOLUTION INTRAVENOUS CONTINUOUS
Status: DISCONTINUED | OUTPATIENT
Start: 2022-08-26 | End: 2022-08-27

## 2022-08-26 RX ORDER — AMOXICILLIN 250 MG
2 CAPSULE ORAL 2 TIMES DAILY
Status: DISCONTINUED | OUTPATIENT
Start: 2022-08-26 | End: 2022-09-01 | Stop reason: HOSPADM

## 2022-08-26 RX ORDER — METFORMIN HYDROCHLORIDE 500 MG/1
1000 TABLET, EXTENDED RELEASE ORAL 2 TIMES DAILY
COMMUNITY

## 2022-08-26 RX ORDER — ACETAMINOPHEN 325 MG/1
650 TABLET ORAL EVERY 6 HOURS PRN
Status: DISCONTINUED | OUTPATIENT
Start: 2022-08-26 | End: 2022-09-01 | Stop reason: HOSPADM

## 2022-08-26 RX ORDER — MIRTAZAPINE 15 MG/1
7.5 TABLET, FILM COATED ORAL NIGHTLY
Status: ON HOLD | COMMUNITY
End: 2022-09-01

## 2022-08-26 RX ORDER — HYDRALAZINE HYDROCHLORIDE 20 MG/ML
10 INJECTION INTRAMUSCULAR; INTRAVENOUS EVERY 4 HOURS PRN
Status: DISCONTINUED | OUTPATIENT
Start: 2022-08-26 | End: 2022-08-26

## 2022-08-26 RX ORDER — CLONAZEPAM 0.5 MG/1
1 TABLET ORAL 2 TIMES DAILY
COMMUNITY

## 2022-08-26 RX ORDER — ONDANSETRON 4 MG/1
4 TABLET, ORALLY DISINTEGRATING ORAL EVERY 4 HOURS PRN
Status: DISCONTINUED | OUTPATIENT
Start: 2022-08-26 | End: 2022-09-01 | Stop reason: HOSPADM

## 2022-08-26 RX ORDER — POLYETHYLENE GLYCOL 3350 17 G/17G
1 POWDER, FOR SOLUTION ORAL
Status: DISCONTINUED | OUTPATIENT
Start: 2022-08-26 | End: 2022-09-01 | Stop reason: HOSPADM

## 2022-08-26 RX ORDER — ONDANSETRON 2 MG/ML
4 INJECTION INTRAMUSCULAR; INTRAVENOUS EVERY 4 HOURS PRN
Status: DISCONTINUED | OUTPATIENT
Start: 2022-08-26 | End: 2022-09-01 | Stop reason: HOSPADM

## 2022-08-26 RX ORDER — LORATADINE 10 MG/1
10 TABLET ORAL DAILY
Status: ON HOLD | COMMUNITY
End: 2022-09-01

## 2022-08-26 RX ORDER — BISACODYL 10 MG
10 SUPPOSITORY, RECTAL RECTAL
Status: DISCONTINUED | OUTPATIENT
Start: 2022-08-26 | End: 2022-09-01 | Stop reason: HOSPADM

## 2022-08-26 RX ORDER — OMEPRAZOLE 20 MG/1
20 CAPSULE, DELAYED RELEASE ORAL DAILY
COMMUNITY

## 2022-08-26 RX ORDER — ATORVASTATIN CALCIUM 80 MG/1
80 TABLET, FILM COATED ORAL NIGHTLY
COMMUNITY

## 2022-08-26 RX ORDER — LITHIUM CARBONATE 300 MG
300 TABLET ORAL 2 TIMES DAILY
Status: ON HOLD | COMMUNITY
End: 2022-09-01

## 2022-08-26 RX ORDER — LISINOPRIL 5 MG/1
5 TABLET ORAL DAILY
Status: ON HOLD | COMMUNITY
End: 2022-09-01

## 2022-08-26 RX ORDER — HEPARIN SODIUM 5000 [USP'U]/ML
5000 INJECTION, SOLUTION INTRAVENOUS; SUBCUTANEOUS EVERY 8 HOURS
Status: DISCONTINUED | OUTPATIENT
Start: 2022-08-27 | End: 2022-08-26

## 2022-08-26 RX ORDER — IPRATROPIUM BROMIDE AND ALBUTEROL SULFATE 2.5; .5 MG/3ML; MG/3ML
3 SOLUTION RESPIRATORY (INHALATION)
Status: DISCONTINUED | OUTPATIENT
Start: 2022-08-26 | End: 2022-09-01 | Stop reason: HOSPADM

## 2022-08-26 RX ORDER — POTASSIUM CHLORIDE 20 MEQ/1
40 TABLET, EXTENDED RELEASE ORAL 2 TIMES DAILY
Status: COMPLETED | OUTPATIENT
Start: 2022-08-26 | End: 2022-08-27

## 2022-08-26 RX ADMIN — POTASSIUM CHLORIDE 40 MEQ: 1500 TABLET, EXTENDED RELEASE ORAL at 22:01

## 2022-08-26 RX ADMIN — DOCUSATE SODIUM 50 MG AND SENNOSIDES 8.6 MG 2 TABLET: 8.6; 5 TABLET, FILM COATED ORAL at 22:01

## 2022-08-26 RX ADMIN — SODIUM CHLORIDE, POTASSIUM CHLORIDE, SODIUM LACTATE AND CALCIUM CHLORIDE: 600; 310; 30; 20 INJECTION, SOLUTION INTRAVENOUS at 22:03

## 2022-08-26 NOTE — TELEPHONE ENCOUNTER
Received a phone call from outside ER that this patient came in after an unwitnessed fall.  The patient denies any chest pain or shortness of breath currently or prior to the fall.  She wasn't sure what caused the fall. Head imaging negative as per ERP.  EKG shows V paced rhythm with questionable ST elevations. I personally reviewed the EKG, which showed V paced rhythm - difficult to diagnose ST elevations on V paced rhythm.   It is reassuring that the patient had been without any chest pain prior to presentation or currently.  Troponin was minimally elevated according the ERP.  It is reasonable to transfer to [facility] for further evaluation of fall/syncope.

## 2022-08-27 ENCOUNTER — APPOINTMENT (OUTPATIENT)
Dept: CARDIOLOGY | Facility: MEDICAL CENTER | Age: 67
DRG: 871 | End: 2022-08-27
Attending: NURSE PRACTITIONER
Payer: MEDICARE

## 2022-08-27 ENCOUNTER — APPOINTMENT (OUTPATIENT)
Dept: RADIOLOGY | Facility: MEDICAL CENTER | Age: 67
DRG: 871 | End: 2022-08-27
Attending: NURSE PRACTITIONER
Payer: MEDICARE

## 2022-08-27 PROBLEM — G93.41 METABOLIC ENCEPHALOPATHY: Status: ACTIVE | Noted: 2022-08-27

## 2022-08-27 PROBLEM — I95.9 SEPSIS ASSOCIATED HYPOTENSION (HCC): Status: ACTIVE | Noted: 2022-08-27

## 2022-08-27 PROBLEM — N30.00 ACUTE CYSTITIS: Status: ACTIVE | Noted: 2022-08-27

## 2022-08-27 PROBLEM — A41.9 SEPSIS ASSOCIATED HYPOTENSION (HCC): Status: ACTIVE | Noted: 2022-08-27

## 2022-08-27 PROBLEM — I95.9 HYPOTENSION: Status: ACTIVE | Noted: 2022-08-27

## 2022-08-27 PROBLEM — G93.40 ENCEPHALOPATHY: Status: ACTIVE | Noted: 2022-08-27

## 2022-08-27 LAB
ALBUMIN SERPL BCP-MCNC: 3.9 G/DL (ref 3.2–4.9)
ALBUMIN/GLOB SERPL: 1.3 G/DL
ALP SERPL-CCNC: 71 U/L (ref 30–99)
ALT SERPL-CCNC: 14 U/L (ref 2–50)
AMMONIA PLAS-SCNC: 22 UMOL/L (ref 11–45)
ANION GAP SERPL CALC-SCNC: 14 MMOL/L (ref 7–16)
APPEARANCE UR: ABNORMAL
AST SERPL-CCNC: 19 U/L (ref 12–45)
BACTERIA #/AREA URNS HPF: ABNORMAL /HPF
BASE EXCESS BLDA CALC-SCNC: -4 MMOL/L (ref -4–3)
BASOPHILS # BLD AUTO: 0.2 % (ref 0–1.8)
BASOPHILS # BLD: 0.03 K/UL (ref 0–0.12)
BILIRUB SERPL-MCNC: 0.3 MG/DL (ref 0.1–1.5)
BILIRUB UR QL STRIP.AUTO: NEGATIVE
BLOOD CULTURE HOLD CXBCH: NORMAL
BODY TEMPERATURE: 36 CENTIGRADE
BUN SERPL-MCNC: 27 MG/DL (ref 8–22)
CALCIUM SERPL-MCNC: 9.9 MG/DL (ref 8.5–10.5)
CHLORIDE SERPL-SCNC: 108 MMOL/L (ref 96–112)
CK SERPL-CCNC: 145 U/L (ref 0–154)
CO2 SERPL-SCNC: 19 MMOL/L (ref 20–33)
COLOR UR: YELLOW
CORTIS SERPL-MCNC: 12.4 UG/DL (ref 0–23)
CREAT SERPL-MCNC: 1.29 MG/DL (ref 0.5–1.4)
DIGOXIN SERPL-MCNC: 1.1 NG/ML (ref 0.8–2)
EOSINOPHIL # BLD AUTO: 0 K/UL (ref 0–0.51)
EOSINOPHIL NFR BLD: 0 % (ref 0–6.9)
EPI CELLS #/AREA URNS HPF: ABNORMAL /HPF
ERYTHROCYTE [DISTWIDTH] IN BLOOD BY AUTOMATED COUNT: 41.4 FL (ref 35.9–50)
GFR SERPLBLD CREATININE-BSD FMLA CKD-EPI: 45 ML/MIN/1.73 M 2
GLOBULIN SER CALC-MCNC: 3 G/DL (ref 1.9–3.5)
GLUCOSE BLD STRIP.AUTO-MCNC: 317 MG/DL (ref 65–99)
GLUCOSE BLD STRIP.AUTO-MCNC: 337 MG/DL (ref 65–99)
GLUCOSE BLD STRIP.AUTO-MCNC: 82 MG/DL (ref 65–99)
GLUCOSE SERPL-MCNC: 52 MG/DL (ref 65–99)
GLUCOSE UR STRIP.AUTO-MCNC: NEGATIVE MG/DL
HCO3 BLDA-SCNC: 20 MMOL/L (ref 17–25)
HCT VFR BLD AUTO: 42.3 % (ref 37–47)
HGB BLD-MCNC: 14.3 G/DL (ref 12–16)
IMM GRANULOCYTES # BLD AUTO: 0.08 K/UL (ref 0–0.11)
IMM GRANULOCYTES NFR BLD AUTO: 0.4 % (ref 0–0.9)
KETONES UR STRIP.AUTO-MCNC: NEGATIVE MG/DL
LEUKOCYTE ESTERASE UR QL STRIP.AUTO: ABNORMAL
LITHIUM SERPL-MCNC: 0.8 MMOL/L (ref 0.6–1.2)
LV EJECT FRACT  99904: 55
LYMPHOCYTES # BLD AUTO: 0.91 K/UL (ref 1–4.8)
LYMPHOCYTES NFR BLD: 5 % (ref 22–41)
MCH RBC QN AUTO: 29.8 PG (ref 27–33)
MCHC RBC AUTO-ENTMCNC: 33.8 G/DL (ref 33.6–35)
MCV RBC AUTO: 88.1 FL (ref 81.4–97.8)
MICRO URNS: ABNORMAL
MONOCYTES # BLD AUTO: 0.31 K/UL (ref 0–0.85)
MONOCYTES NFR BLD AUTO: 1.7 % (ref 0–13.4)
NEUTROPHILS # BLD AUTO: 16.81 K/UL (ref 2–7.15)
NEUTROPHILS NFR BLD: 92.7 % (ref 44–72)
NITRITE UR QL STRIP.AUTO: NEGATIVE
NRBC # BLD AUTO: 0 K/UL
NRBC BLD-RTO: 0 /100 WBC
PCO2 BLDA: 35 MMHG (ref 26–37)
PH BLDA: 7.38 [PH] (ref 7.4–7.5)
PH UR STRIP.AUTO: 5 [PH] (ref 5–8)
PLATELET # BLD AUTO: 377 K/UL (ref 164–446)
PMV BLD AUTO: 9 FL (ref 9–12.9)
PO2 BLDA: 90.3 MMHG (ref 64–87)
POTASSIUM SERPL-SCNC: 3.5 MMOL/L (ref 3.6–5.5)
PROT SERPL-MCNC: 6.9 G/DL (ref 6–8.2)
PROT UR QL STRIP: NEGATIVE MG/DL
RBC # BLD AUTO: 4.8 M/UL (ref 4.2–5.4)
RBC # URNS HPF: ABNORMAL /HPF
RBC UR QL AUTO: NEGATIVE
SAO2 % BLDA: 95.6 % (ref 93–99)
SODIUM SERPL-SCNC: 141 MMOL/L (ref 135–145)
SP GR UR STRIP.AUTO: >=1.045
TROPONIN T SERPL-MCNC: 16 NG/L (ref 6–19)
TROPONIN T SERPL-MCNC: 17 NG/L (ref 6–19)
UROBILINOGEN UR STRIP.AUTO-MCNC: 0.2 MG/DL
WBC # BLD AUTO: 18.1 K/UL (ref 4.8–10.8)
WBC #/AREA URNS HPF: ABNORMAL /HPF

## 2022-08-27 PROCEDURE — 770020 HCHG ROOM/CARE - TELE (206)

## 2022-08-27 PROCEDURE — 700102 HCHG RX REV CODE 250 W/ 637 OVERRIDE(OP): Performed by: STUDENT IN AN ORGANIZED HEALTH CARE EDUCATION/TRAINING PROGRAM

## 2022-08-27 PROCEDURE — 99233 SBSQ HOSP IP/OBS HIGH 50: CPT | Performed by: HOSPITALIST

## 2022-08-27 PROCEDURE — 87040 BLOOD CULTURE FOR BACTERIA: CPT | Mod: 91

## 2022-08-27 PROCEDURE — 99232 SBSQ HOSP IP/OBS MODERATE 35: CPT | Performed by: STUDENT IN AN ORGANIZED HEALTH CARE EDUCATION/TRAINING PROGRAM

## 2022-08-27 PROCEDURE — 84484 ASSAY OF TROPONIN QUANT: CPT | Mod: 91

## 2022-08-27 PROCEDURE — 82550 ASSAY OF CK (CPK): CPT

## 2022-08-27 PROCEDURE — 93306 TTE W/DOPPLER COMPLETE: CPT | Mod: 26 | Performed by: INTERNAL MEDICINE

## 2022-08-27 PROCEDURE — 80053 COMPREHEN METABOLIC PANEL: CPT

## 2022-08-27 PROCEDURE — 80178 ASSAY OF LITHIUM: CPT

## 2022-08-27 PROCEDURE — 96374 THER/PROPH/DIAG INJ IV PUSH: CPT

## 2022-08-27 PROCEDURE — 82140 ASSAY OF AMMONIA: CPT

## 2022-08-27 PROCEDURE — 82533 TOTAL CORTISOL: CPT

## 2022-08-27 PROCEDURE — 80162 ASSAY OF DIGOXIN TOTAL: CPT

## 2022-08-27 PROCEDURE — 700105 HCHG RX REV CODE 258: Performed by: STUDENT IN AN ORGANIZED HEALTH CARE EDUCATION/TRAINING PROGRAM

## 2022-08-27 PROCEDURE — 85025 COMPLETE CBC W/AUTO DIFF WBC: CPT

## 2022-08-27 PROCEDURE — 92610 EVALUATE SWALLOWING FUNCTION: CPT

## 2022-08-27 PROCEDURE — 87077 CULTURE AEROBIC IDENTIFY: CPT

## 2022-08-27 PROCEDURE — 82803 BLOOD GASES ANY COMBINATION: CPT

## 2022-08-27 PROCEDURE — 700101 HCHG RX REV CODE 250: Performed by: STUDENT IN AN ORGANIZED HEALTH CARE EDUCATION/TRAINING PROGRAM

## 2022-08-27 PROCEDURE — 87086 URINE CULTURE/COLONY COUNT: CPT

## 2022-08-27 PROCEDURE — 36415 COLL VENOUS BLD VENIPUNCTURE: CPT

## 2022-08-27 PROCEDURE — 700111 HCHG RX REV CODE 636 W/ 250 OVERRIDE (IP): Performed by: HOSPITALIST

## 2022-08-27 PROCEDURE — A9270 NON-COVERED ITEM OR SERVICE: HCPCS | Performed by: STUDENT IN AN ORGANIZED HEALTH CARE EDUCATION/TRAINING PROGRAM

## 2022-08-27 PROCEDURE — 82962 GLUCOSE BLOOD TEST: CPT

## 2022-08-27 PROCEDURE — 93306 TTE W/DOPPLER COMPLETE: CPT

## 2022-08-27 PROCEDURE — 700102 HCHG RX REV CODE 250 W/ 637 OVERRIDE(OP): Performed by: HOSPITALIST

## 2022-08-27 PROCEDURE — 700111 HCHG RX REV CODE 636 W/ 250 OVERRIDE (IP): Performed by: STUDENT IN AN ORGANIZED HEALTH CARE EDUCATION/TRAINING PROGRAM

## 2022-08-27 PROCEDURE — 96375 TX/PRO/DX INJ NEW DRUG ADDON: CPT

## 2022-08-27 PROCEDURE — 87186 SC STD MICRODIL/AGAR DIL: CPT

## 2022-08-27 RX ORDER — HYDRALAZINE HYDROCHLORIDE 20 MG/ML
10 INJECTION INTRAMUSCULAR; INTRAVENOUS
Status: DISCONTINUED | OUTPATIENT
Start: 2022-08-27 | End: 2022-08-29

## 2022-08-27 RX ORDER — HEPARIN SODIUM 5000 [USP'U]/ML
5000 INJECTION, SOLUTION INTRAVENOUS; SUBCUTANEOUS EVERY 8 HOURS
Status: DISCONTINUED | OUTPATIENT
Start: 2022-08-27 | End: 2022-08-30

## 2022-08-27 RX ORDER — SODIUM CHLORIDE, SODIUM LACTATE, POTASSIUM CHLORIDE, AND CALCIUM CHLORIDE .6; .31; .03; .02 G/100ML; G/100ML; G/100ML; G/100ML
1000 INJECTION, SOLUTION INTRAVENOUS ONCE
Status: COMPLETED | OUTPATIENT
Start: 2022-08-27 | End: 2022-08-27

## 2022-08-27 RX ORDER — LABETALOL HYDROCHLORIDE 5 MG/ML
20 INJECTION, SOLUTION INTRAVENOUS EVERY 4 HOURS PRN
Status: DISCONTINUED | OUTPATIENT
Start: 2022-08-27 | End: 2022-08-27

## 2022-08-27 RX ORDER — LABETALOL HYDROCHLORIDE 5 MG/ML
10 INJECTION, SOLUTION INTRAVENOUS
Status: DISCONTINUED | OUTPATIENT
Start: 2022-08-27 | End: 2022-08-29

## 2022-08-27 RX ORDER — ATORVASTATIN CALCIUM 80 MG/1
80 TABLET, FILM COATED ORAL NIGHTLY
Status: DISCONTINUED | OUTPATIENT
Start: 2022-08-27 | End: 2022-09-01 | Stop reason: HOSPADM

## 2022-08-27 RX ADMIN — INSULIN HUMAN 4 UNITS: 100 INJECTION, SOLUTION PARENTERAL at 21:24

## 2022-08-27 RX ADMIN — HEPARIN SODIUM 5000 UNITS: 5000 INJECTION, SOLUTION INTRAVENOUS; SUBCUTANEOUS at 21:20

## 2022-08-27 RX ADMIN — HYDROCORTISONE SODIUM SUCCINATE 100 MG: 100 INJECTION, POWDER, FOR SOLUTION INTRAMUSCULAR; INTRAVENOUS at 04:19

## 2022-08-27 RX ADMIN — SODIUM CHLORIDE, POTASSIUM CHLORIDE, SODIUM LACTATE AND CALCIUM CHLORIDE 1000 ML: 600; 310; 30; 20 INJECTION, SOLUTION INTRAVENOUS at 00:30

## 2022-08-27 RX ADMIN — ATORVASTATIN CALCIUM 80 MG: 80 TABLET, FILM COATED ORAL at 06:36

## 2022-08-27 RX ADMIN — DOCUSATE SODIUM 50 MG AND SENNOSIDES 8.6 MG 2 TABLET: 8.6; 5 TABLET, FILM COATED ORAL at 06:36

## 2022-08-27 RX ADMIN — ATORVASTATIN CALCIUM 80 MG: 80 TABLET, FILM COATED ORAL at 21:19

## 2022-08-27 RX ADMIN — INSULIN HUMAN 4 UNITS: 100 INJECTION, SOLUTION PARENTERAL at 18:26

## 2022-08-27 RX ADMIN — CEFTRIAXONE SODIUM 1 G: 10 INJECTION, POWDER, FOR SOLUTION INTRAVENOUS at 00:58

## 2022-08-27 RX ADMIN — POTASSIUM CHLORIDE 40 MEQ: 1500 TABLET, EXTENDED RELEASE ORAL at 06:35

## 2022-08-27 RX ADMIN — SODIUM CHLORIDE, POTASSIUM CHLORIDE, SODIUM LACTATE AND CALCIUM CHLORIDE 1000 ML: 600; 310; 30; 20 INJECTION, SOLUTION INTRAVENOUS at 01:50

## 2022-08-27 ASSESSMENT — COGNITIVE AND FUNCTIONAL STATUS - GENERAL
EATING MEALS: A LITTLE
MOVING TO AND FROM BED TO CHAIR: A LITTLE
TOILETING: A LITTLE
DRESSING REGULAR UPPER BODY CLOTHING: A LITTLE
HELP NEEDED FOR BATHING: A LOT
MOVING FROM LYING ON BACK TO SITTING ON SIDE OF FLAT BED: A LOT
DRESSING REGULAR LOWER BODY CLOTHING: A LOT
STANDING UP FROM CHAIR USING ARMS: A LITTLE
CLIMB 3 TO 5 STEPS WITH RAILING: A LOT
SUGGESTED CMS G CODE MODIFIER MOBILITY: CK
DAILY ACTIVITIY SCORE: 16
TURNING FROM BACK TO SIDE WHILE IN FLAT BAD: A LITTLE
WALKING IN HOSPITAL ROOM: A LOT
MOBILITY SCORE: 15
SUGGESTED CMS G CODE MODIFIER DAILY ACTIVITY: CK
PERSONAL GROOMING: A LITTLE

## 2022-08-27 ASSESSMENT — LIFESTYLE VARIABLES
HAVE YOU EVER FELT YOU SHOULD CUT DOWN ON YOUR DRINKING: NO
ON A TYPICAL DAY WHEN YOU DRINK ALCOHOL HOW MANY DRINKS DO YOU HAVE: 0
EVER HAD A DRINK FIRST THING IN THE MORNING TO STEADY YOUR NERVES TO GET RID OF A HANGOVER: NO
ALCOHOL_USE: NO
SUBSTANCE_ABUSE: 0
HOW MANY TIMES IN THE PAST YEAR HAVE YOU HAD 5 OR MORE DRINKS IN A DAY: 0
EVER FELT BAD OR GUILTY ABOUT YOUR DRINKING: NO
HAVE PEOPLE ANNOYED YOU BY CRITICIZING YOUR DRINKING: NO
AVERAGE NUMBER OF DAYS PER WEEK YOU HAVE A DRINK CONTAINING ALCOHOL: 0
TOTAL SCORE: 0
CONSUMPTION TOTAL: NEGATIVE
DOES PATIENT WANT TO STOP DRINKING: NO
TOTAL SCORE: 0
TOTAL SCORE: 0

## 2022-08-27 ASSESSMENT — ENCOUNTER SYMPTOMS
VOMITING: 0
CHEST TIGHTNESS: 0
WEAKNESS: 1
FALLS: 1
SPEECH CHANGE: 1
NAUSEA: 0
DIARRHEA: 0
SORE THROAT: 0
ABDOMINAL PAIN: 0
CHOKING: 0
BACK PAIN: 0
APNEA: 0
STRIDOR: 0
SPUTUM PRODUCTION: 0
CONSTIPATION: 0
COUGH: 0
DIZZINESS: 0
FEVER: 1
SENSORY CHANGE: 0
FOCAL WEAKNESS: 0
WHEEZING: 0
DEPRESSION: 0
MYALGIAS: 1
EYE DISCHARGE: 0
LOSS OF CONSCIOUSNESS: 1
HEMOPTYSIS: 0
WEAKNESS: 0
HEADACHES: 0
SHORTNESS OF BREATH: 0
BRUISES/BLEEDS EASILY: 0
EYE PAIN: 0
SPEECH CHANGE: 0
DIAPHORESIS: 0
NECK PAIN: 0
PALPITATIONS: 0
CHILLS: 1
CLAUDICATION: 0

## 2022-08-27 ASSESSMENT — FIBROSIS 4 INDEX
FIB4 SCORE: 0.92
FIB4 SCORE: 0.92

## 2022-08-27 ASSESSMENT — PAIN DESCRIPTION - PAIN TYPE: TYPE: ACUTE PAIN

## 2022-08-27 NOTE — H&P
Hospital Medicine History & Physical Note    Date of Service  8/26/2022    Primary Care Physician  Pcp Pt States None    Consultants  None    Code Status  Full Code    Chief Complaint  Chief Complaint   Patient presents with    Irregular Heart Beat     Transfer from [facility] fall at home c/c hip pain, EMS with facial droop resolved and -stroke, at hosp abnormal EKG with inf/lateral STEMI and increased Trop, +blood thinners, +pacemaker       History of Presenting Illness  Feli Vuong is a 66 y.o. female with extensive medical history to include hypertension, hyperlipidemia, paroxysmal A. Flutter, diabetes mellitus, hypertension, hyperlipidemia, bipolar affective disorder, sick sinus syndrome status post pacemaker placement who presented 8/26/2022 as a transfer from OSH for STEMI (cardiology determined no STEMI).    Patient presented to [facility] after being found on the ground syncope versus ground-level fall (story is unclear) with complaint of right hip pain.  Reports that she was having slurred speech and possible facial droop and there was concerns for stroke.  On chart review she was not reporting any chest pain on arrival to OSH.  EKG had concerns of STEMI, she was given 1 L normal saline for soft blood sugars and full dose aspirin, troponin noted to be 0.04 Pro-Jj 0.15 elevated BUN/serum creatinine.  Patient was sent to [facility] for further evaluation.  At their facility she underwent CT head without negative for acute intracranial abnormality, CTA head and neck negative for CVA, LVO, aneurysm.  Right hip x-ray negative for acute fracture or dislocation.    Arrival to the [facility] ED she was afebrile heart rate 74 respiratory rate 18 oxygen saturation 96% on room air, BP 90/64.  She was immediately evaluated by ERP and cardiology.  EKG shows AV dual paced rhythm, deemed not to be a STEMI.  Recommendation made to obtain echocardiogram monitor on telemetry and interrogate her device, trend  troponin.  Work-up shows leukocytosis 13.1, potassium 3.2 BUN 29 serum creatinine 1.46 lipase 19 undetectable alcohol troponin T 21 proBNP 787 chest x-ray negative for acute cardiopulmonary abnormalities.  Patient subsequently referred to hospitalist for admission for syncope and chest pain rule out ischemia.    While she was in the ED I was notified by bedside RN that her blood pressure trended down to the 60s.  I immediately went to bedside. she was weak and lethargic, falling asleep during conversation. she was given a IV fluid bolus with immediate improvement of her blood pressure and mentation, weakness of left upper extremity.  Stat blood cultures were sent, cortisol checked.  UA results reviewed suggest possibility of infection started on ceftriaxone given clinical scenario, ABG ordered and shows pH 7.38 PCO2 35 PO2 90.3 on room air.  Following that episode she was hypertensive to the 190s, improved mentation, improved strength able to move all extremities and lift her self onto a bedpan.    I discussed the plan of care with patient, bedside RN, and pharmacy.    Review of Systems  Review of Systems   Unable to perform ROS: Mental status change   Cardiovascular:  Positive for chest pain.   Musculoskeletal:  Positive for falls.   Neurological:  Positive for speech change and weakness.     Past Medical History   has a past medical history of Atrial flutter (HCC) (7/15/2013), Bipolar affective (HCC) (4/19/2013), Cardiac pacemaker in situ (7/15/2013), Chronic pain (4/19/2013), Diabetes mellitus type II (4/19/2013), Precordial chest pain (7/15/2013), Pulmonary hypertension (HCC) (5/16/2014), Sick sinus syndrome (HCC) (2/2/2013), and Tachy-kamran syndrome (HCC) (2/2/2013).    Surgical History   has a past surgical history that includes pacemaker insertion; hysterectomy, vaginal; and bladder suspension.     Family History  family history includes Heart Attack in her mother; Heart Disease in her mother.   Family  history reviewed with patient. There is no family history that is pertinent to the chief complaint.     Social History   reports that she has never smoked. She has never used smokeless tobacco. She reports that she does not drink alcohol and does not use drugs.    Allergies  Allergies   Allergen Reactions    Septra [Bactrim]     Tape     Tegretol [Carbamazepine]     Digoxin Vomiting     When taken with antibiotics         Medications  Prior to Admission Medications   Prescriptions Last Dose Informant Patient Reported? Taking?   CARTIA  MG CAPSULE SR 24 HR   No No   Sig: TAKE ONE CAPSULE ORALLY EACH DAY.   Fluticasone Furoate (VERAMYST NA)   Yes No   Sig: Spray 2 Puffs in nose every day.   Polyethylene Glycol 3350 (MIRALAX PO)   Yes No   Sig: Take  by mouth every day.   XARELTO 20 MG Tab tablet   No No   Sig: TAKE ONE TABLET DAILY WITH DINNER.   albuterol (VENTOLIN OR PROVENTIL) 108 (90 BASE) MCG/ACT AERS inhalation aerosol   Yes No   Sig: Inhale 2 Puffs by mouth every 6 hours as needed for Shortness of Breath.   atorvastatin (LIPITOR) 40 MG TABS   Yes No   Sig: Take 40 mg by mouth every evening.   clonazepam (KLONOPIN) 1 MG TABS   Yes No   Sig: Take 0.5 mg by mouth 3 times a day.     digoxin (LANOXIN) 0.25 MG TABS   Yes No   Sig: Take 250 mcg by mouth every day.   diltiazem (DILACOR XR) 180 MG XR capsule   No No   Sig: Take 1 Cap by mouth every day.   docusate calcium (SURFAK) 240 MG CAPS   Yes No   Sig: Take 240 mg by mouth 2 times a day.     estradiol (ESTRACE) 1 MG TABS   Yes No   Sig: Take 1 mg by mouth every day. Dose not listed. Frequency is 23 duration    flecainide (TAMBOCOR) 100 MG Tab   No No   Sig: TAKE 1 TABLET TWICE DAILY.   gabapentin (NEURONTIN) 800 MG tablet   Yes No   Sig: Take 800 mg by mouth 4 times a day.   hydrocodone-acetaminophen (NORCO) 5-325 MG TABS per tablet   Yes No   Sig: Take 1-2 Tabs by mouth every four hours as needed.   insulin glargine (LANTUS) 100 UNIT/ML SOLN   Yes No   Sig:  "Inject  as instructed every evening.     lisinopril (PRINIVIL) 5 MG Tab   No No   Sig: Take 0.5 Tabs by mouth every day. Needs to be seen for further refills. Thank you   metformin (GLUCOPHAGE) 500 MG TABS   Yes No   Sig: Take 500 mg by mouth 2 times a day, with meals. Pt doesn't remember dosage. \" it doesn't work anyway'    risperidone (RISPERDAL) 0.5 MG TABS   Yes No   Sig: Take 0.25 mg by mouth 2 times a day.     tramadol (ULTRAM) 50 MG TABS   Yes No   Sig: Take  mg by mouth 3 times a day.      Facility-Administered Medications: None       Physical Exam  Temp:  [36.6 °C (97.8 °F)] 36.6 °C (97.8 °F)  Pulse:  [72-74] 72  Resp:  [14-18] 14  BP: ()/(57-64) 122/57  SpO2:  [96 %-97 %] 97 %  Blood Pressure : 122/57   Temperature: 36.6 °C (97.8 °F)   Pulse: 72   Respiration: 14   Pulse Oximetry: 97 %       Physical Exam  Vitals and nursing note reviewed. Exam conducted with a chaperone present.   Constitutional:       General: She is not in acute distress.     Appearance: She is normal weight. She is ill-appearing. She is not diaphoretic.      Comments: 66-year-old female appears older than stated age appears chronically ill somnolent but when she does wake up generally answers questions appropriately/logically.  No acute distress, endorses chronic left shoulder pain from a prior trauma.   HENT:      Head: Normocephalic and atraumatic.      Nose: Nose normal. No rhinorrhea.      Mouth/Throat:      Mouth: Mucous membranes are dry.      Pharynx: Oropharynx is clear.   Eyes:      Extraocular Movements: Extraocular movements intact.      Pupils: Pupils are equal, round, and reactive to light.   Cardiovascular:      Rate and Rhythm: Normal rate and regular rhythm.      Pulses: Normal pulses.      Heart sounds: No murmur heard.  Pulmonary:      Effort: No respiratory distress.      Breath sounds: No wheezing, rhonchi or rales.      Comments: Poor inspiratory effort, poor cough  Abdominal:      Palpations: Abdomen " is soft.      Tenderness: There is no abdominal tenderness. There is no guarding or rebound.   Musculoskeletal:         General: No tenderness. Normal range of motion.      Cervical back: Normal range of motion and neck supple. No rigidity or tenderness.      Comments: During hypotensive episode patient having difficulty lifting her left upper extremity additionally she had pallor to her left hand and was cool to touch although still with good radial pulse.  After BP improved she was able to move her arm more fully and even support her weight so that she could sit on bedpan   Skin:     General: Skin is dry.      Capillary Refill: Capillary refill takes 2 to 3 seconds.      Findings: No lesion or rash.   Neurological:      Mental Status: She is oriented to person, place, and time.      Cranial Nerves: No cranial nerve deficit.      Sensory: No sensory deficit.      Motor: No weakness.      Coordination: Coordination normal.      Comments: Face symmetrical, tongue midline, able to move all extremities antigravity, no hemineglect or gaze preference, speech occasionally slurred but clears up with effort and when she is stimulated to be more awake.       Laboratory:  Recent Labs     08/26/22 1910   WBC 13.1*   RBC 4.64   HEMOGLOBIN 13.9   HEMATOCRIT 39.8   MCV 85.8   MCH 30.0   MCHC 34.9   RDW 39.8   PLATELETCT 362   MPV 9.3     Recent Labs     08/26/22 1910   SODIUM 139   POTASSIUM 3.2*   CHLORIDE 108   CO2 18*   GLUCOSE 112*   BUN 29*   CREATININE 1.46*   CALCIUM 10.1     Recent Labs     08/26/22 1910   ALTSGPT 16   ASTSGOT 17   ALKPHOSPHAT 74   TBILIRUBIN 0.3   LIPASE 19   GLUCOSE 112*     Recent Labs     08/26/22 1910   APTT 26.4   INR 1.00     Recent Labs     08/26/22 1910   NTPROBNP 787*         Recent Labs     08/26/22 1910   TROPONINT 21*       Imaging:  DX-CHEST-LIMITED (1 VIEW)   Final Result         No acute cardiac or pulmonary abnormality is identified.      CT-FOREIGN FILM CAT SCAN   Final Result       OUTSIDE IMAGES-CT HEAD   Final Result      OUTSIDE IMAGES-DX CHEST   Final Result      [id number] FILM X-RAY   Final Result      EC-ECHOCARDIOGRAM COMPLETE W/O CONT    (Results Pending)   MR-BRAIN-W/O    (Results Pending)       X-Ray:  My impression is: No acute cardiopulmonary processes  EKG:  I have personally reviewed the images and compared with prior images. and My impression is: EKG shows AV dual paced rhythm, no STEMI    Assessment/Plan:  Justification for Admission Status  I anticipate this patient is appropriate for observation status at this time because chest pain with encephalopathy      Encephalopathy- (present on admission)  Assessment & Plan  -CT imaging to include angiography negative at OSH for CVA/LVO/stenosis/aneurysm, will obtain MRI  -vitals and neuro checks q4h  -IVF  -Limit sedatives, attempt to minimize risk of delirium such as avoiding day time napping and promote night time sleep, frequent reorientation, monitor for constipation, remove lines/tubing not needed, avoid early lab draws and vital checks, limit polypharmacy as able, and keep close to the window  -Aspiration precautions           Hypotension- (present on admission)  Assessment & Plan  While in ED BP trended down to the 60's. Responded to IVF bolus.  Given the hypotension and encephalopathy with somnolence and unclear clinical picture I obtained blood cultures, cortisol, started ceftriaxone, checked ABG.  BP immediately improved with IVF bolus, mentation improved.   Cortisol checked and was low, 12. I will provide one dose of Solu-Cortef 100 mg IV but I suspect her hypotension was more from dehydration.  If any further episodes of hypotension continues with stress dose steroids.    Acute cystitis- (present on admission)  Assessment & Plan  3 days ceftriaxone, follow-up urine and blood cultures.    Elevated troponin- (present on admission)  Assessment & Plan  Mildly elevated 22, no ongoing chest pain, had mild chest pain  on arrival since resolved.  EKG shows A-V dual-paced rhythm with some inhibition  Will trend    Hypokalemia- (present on admission)  Assessment & Plan  Replacement ordered.     MARCE (acute kidney injury) (HCC)- (present on admission)  Assessment & Plan  UTI + Pre-renal etiology with syncope and hypotension.    Rule out post obstruction  IVF  Renal dose meds and avoid nephrotoxins  Monitor I&O's  Follow renal function      TIA (transient ischemic attack)- (present on admission)  Assessment & Plan  Patient had symptoms speech difficulties reported facial droop concern for stroke at OSH CT neuroimaging to include angiography was negative for acute CVA/LVO/aneurysm/significant stenosis.  MRI  Monitor on telemetry  Continue statin    Syncope- (present on admission)  Assessment & Plan  Monitor on telemetry, check orthostatic vitals, TTE.  IV fluid    Paroxysmal atrial flutter (HCC)- (present on admission)  Assessment & Plan  Patient states she cannot remember the last time she took her medications, in regards to cardiac meds on her MAR metoprolol, diltiazem, digoxin, flecainide, Xarelto.  Cardiology consulted in ED and following, can discuss with them in the morning in regards to an appropriate regimen.  Check dig level.  Monitor on telemetry    Mixed hyperlipidemia- (present on admission)  Assessment & Plan  Continue statin    Bipolar affective (HCC)- (present on admission)  Assessment & Plan  Medications on MAR mirtazapine, lithium, risperidone, clonazepam, quetiapine although patient states she does not take any.      Sick sinus syndrome (HCC)- (present on admission)  Assessment & Plan  Status post PPM placement      VTE prophylaxis: SCDs/TEDs

## 2022-08-27 NOTE — PROGRESS NOTES
Cardiology Follow Up Progress Note    Date of Service  8/27/2022    Attending Physician  Ericka Cotto M.D.    Chief Complaint     Initially presented to outside hospital after an unwitnessed fall, patient was unsure if she lost consciousness and states it may be due to tripping due to her cats.      EKG on arrival showed paced rhythm no concern for ACS.    GT Vuong is a 66 y.o. female who was transferred from outside facility for concerns of ST elevation on EKG and elevated troponin 8/26/2022.  EKG showed paced rhythm no concern for ACS.    PMH: Paroxysmal atrial flutter, on flecainide, on Xarelto, hypertension, hyperlipidemia, type 2 diabetes, bipolar affective disorder, sick sinus syndrome status post pacemaker placement, SafetyCulture.    Interim Events  Unable to undergo MPI 8/27/2022, patient uncooperative.  Currently very confused.  Echocardiogram in process    Review of Systems  Review of Systems   Respiratory:  Negative for apnea, cough, choking, chest tightness, shortness of breath, wheezing and stridor.      Vital signs in last 24 hours  Temp:  [36.6 °C (97.8 °F)-37.1 °C (98.7 °F)] 37.1 °C (98.7 °F)  Pulse:  [60-74] 61  Resp:  [12-20] 16  BP: ()/(42-83) 139/63  SpO2:  [93 %-98 %] 96 %    Physical Exam  Physical Exam  Constitutional:       Comments: confused   Cardiovascular:      Pulses: Normal pulses.      Comments: A-V paced  Skin:     General: Skin is warm.       Lab Review  Lab Results   Component Value Date/Time    WBC 18.1 (H) 08/27/2022 09:42 AM    RBC 4.80 08/27/2022 09:42 AM    HEMOGLOBIN 14.3 08/27/2022 09:42 AM    HEMATOCRIT 42.3 08/27/2022 09:42 AM    MCV 88.1 08/27/2022 09:42 AM    MCH 29.8 08/27/2022 09:42 AM    MCHC 33.8 08/27/2022 09:42 AM    MPV 9.0 08/27/2022 09:42 AM      Lab Results   Component Value Date/Time    SODIUM 141 08/27/2022 01:30 AM    POTASSIUM 3.5 (L) 08/27/2022 01:30 AM    CHLORIDE 108 08/27/2022 01:30 AM    CO2 19 (L) 08/27/2022 01:30 AM    GLUCOSE 52 (L)  "08/27/2022 01:30 AM    BUN 27 (H) 08/27/2022 01:30 AM    CREATININE 1.29 08/27/2022 01:30 AM      Lab Results   Component Value Date/Time    ASTSGOT 19 08/27/2022 01:30 AM    ALTSGPT 14 08/27/2022 01:30 AM     Lab Results   Component Value Date/Time    TROPONINT 16 08/27/2022 09:42 AM       Recent Labs     08/26/22  1910   NTPROBNP 787*       Cardiac Imaging and Procedures Review  EKG:  My personal interpretation of the EKG dated 8/26/2022 is A-V dual paced rhythm.    Echocardiogram: Pending    Cardiac Catheterization: Not applicable    Imaging  Chest X-Ray:  Cardiac pacemaker and pacing wires are again noted.  Heart size is within normal limits.  No focal infiltrates or consolidations are identified in the lungs.  No pleural fluid collections are identified.  No pneumothorax is appreciated     Stress Test: Pending    Assessment/Plan    #Syncope/fall, etiology not entirely clear.  #CT head negative at outside facility for CVA.  Unable to undergo MRI due to MRI\" unconditional.  #Pacemaker in situ, [employer], 2013  #Indeterminant elevated troponin.  #MARCE, improving  #Paroxysmal A. fib/flutter, home meds flecainide and Xarelto, on hold.    Recommendations    #Repeat MPI 8/28/2022 if patient cooperates, attempt to undergo MPI 8/27/2022 failed due to patient being uncooperative.  #Obtain echocardiogram to assess LVEF and any structural abnormalities.  #Monitor on telemetry for any arrhythmias.  #Device interrogated in outside facility showed paroxysmal A. fib/flutter, no arrhythmias.    Cardiology will follow along    I personally spent a total of 12 minutes which includes face-to-face time and non-face-to-face time spent on preparing to see the patient, reviewing hospital notes and tests, obtaining history from the patient, performing a medically appropriate exam, counseling and educating the patient, ordering medications/tests/procedures/referrals as clinically indicated, and documenting information in the " electronic medical record.       Thank you for allowing me to participate in the care of this patient.  I will continue to follow this patient    Please contact me with any questions.    SHARONDA Roberto.   Cardiologist, [facility]  [phone]

## 2022-08-27 NOTE — THERAPY
"Speech Language Pathology   Clinical Swallow Evaluation     Patient Name: Feli Vuong  AGE:  66 y.o., SEX:  female  Medical Record #: [id number]  Today's Date: 2022          Assessment    Patient is a 66 y.o. female who was admitted 22 from outside hospital with possible STEMI and TIA, found with urinary source sepsis with hypotension. Pt presented to [facility] after being found down with syncope vs GLF. All imaging at OSH was negative.     PMHx includes hypertension, hyperlipidemia, paroxysmal A. Flutter, diabetes mellitus, hypertension, hyperlipidemia, bipolar affective disorder, sick sinus syndrome s/p pacemaker placement. No history of SLP found in this EMR.    Imaging  CXR :  No acute cardiac or pulmonary abnormality is identified.        Level of Consciousness: Alert  Affect/Behavior: Agitated, Appropriate, Calm; fluctuating  Follows Directives: Yes - simple commands only  Orientation: Self,  only; disoriented to location \"[place]! I said [place] and that's it\"   Hearing: Functional hearing  Vision: Functional vision      Prior Living Situation & Level of Function:  Pt reported she lived alone at baseline. Difficult to determine PLOF re: swallowing, as she reported difficulty that she attributed to \"Chris withholding water from me.\" Unreliable historian at this time.       Oral Mechanism Evaluation  Facial Symmetry: Equal  Facial Sensation: Equal  Labial Observations: WFL  Lingual Observations: Midline  Dentition: Missing posterior dentition, Upper denture  Comments: Bulge noted on anterior neck that pt reported was a goiter she has had \"since I was 44\"       Voice  Quality: WFL  Resonance: WFL  Intensity: Appropriate  Cough: WFL  Comments:      Current Method of Nutrition   NPO until cleared by speech pathology      Subjective  Pt was alternately pleasant and cooperative and other times easily agitated.        Assessment  Positioning: Nichols's (60-90 degrees)  Bolus Administration: SLP and " Patient  Oxygen Requirements: Room Air  Factor(s) Affecting Performance: Impaired mental status    Swallowing Trials  Ice: WFL  Thin Liquid (TN0): WFL  Liquidised (LQ3): WFL  Pureed (PU4): WFL  Soft & Bite-Sized (SB6): Impaired  Regular (RG7): Impaired    Comments:  Pt was positioned upright and assessed with self- and clinician-delivered PO trials. Pt demonstrated some difficulty with self-feeding r/t poorly coordinated upper extremity movement and poor attention. Adequate bolus stripping from utensil with prolonged bolus manipulation. Poor attention to bolus in the oral cavity requiring cues to chew and swallow. Swallow initiation appeared fairly timely, intermittently requiring multiple swallows/bolus. No clinical s/sx aspiration noted across trials, though pt is at greater risk at this time due to mentation.        Clinical Impressions  Clinical s/sx dysphagia include poor bolus manipulation and transit complicated by poor attention to task and bolus in the oral cavity. Pt swallowed all trials safely at this time. Pt's greatest barrier to safety appears to be mentation vs dysphagia, causing greater risk for aspiration. Recommend modified diet to mitigate risk at this time.        Recommendations  1.  Soft and bite sized solids and thin liquids   2.  Instrumentation: None indicated at this time  3.  Swallowing Instructions & Precautions:   Supervision: Assist with meal tray set up, Direct supervision during meals  Positioning: Fully upright and midline during oral intake  Medication: Whole with liquid, Whole with puree, As tolerated  Strategies: Small bites/sips, Alternate bites and sips, Reduce environmental distractions  Oral Care: Q8h  4.     Plan    Recommend Speech Therapy 3 times per week until therapy goals are met for the following treatments:  Dysphagia Training and Patient / Family / Caregiver Education.     Objective       08/27/22 1500   Vitals   O2 (LPM) 0   O2 Delivery Device None - Room Air   Prior  "Level Of Function   Communication Unknown   Swallow Unknown  (c/o difficulty r/t dryness)   Dentition   (missing lower molars, edentulous upper)   Dentures Uppers   Hearing Within Functional Limits for Evaluation   Hearing Aid None   Vision Within Functional Limits for Evaluation   Patient's Primary Language English   Patient / Family Goals   Patient / Family Goal #1 \"I need some water\"   Short Term Goals   Short Term Goal # 1 Pt will consume SB6/TN0 free from s/sx aspiration or respiratory compromise.     "

## 2022-08-27 NOTE — ASSESSMENT & PLAN NOTE
Medications on MAR mirtazapine, lithium, risperidone, clonazepam, quetiapine although patient states she does not take any.  8/30/2022:  Consult with psychiatry for medication adjustment/evaluation recommendations appreciated  8/31/2022:  Continue present medical management

## 2022-08-27 NOTE — CONSULTS
Reason for Consult:  Asked by Dr Malachi Maurice M.D. to see this patient with transfer for possible STEMI  Patient's PCP: Felipa Vera N.P.    CC:   Chief Complaint   Patient presents with    Irregular Heart Beat     Transfer from [facility] at home c/c hip pain, EMS with facial droop resolved and -stroke, at hosp abnormal EKG with inf/lateral STEMI and increased Trop, +blood thinners, +pacemaker       HPI: Feli Vuong is a 66-year-old woman with history of sick sinus syndrome status post pacemaker placement, paroxysmal atrial flutter, diabetes, hypertension, and dyslipidemia who was transferred from outside facility for concerns of ST elevations on EKG and elevated troponin.    The patient presented to the outside hospital after an unwitnessed fall.  The patient was not sure if she lost consciousness, and states it may be due to tripping due to her cats.  She reports mild shortness of breath and chest pain currently.  On arrival to our ER, EKG shows paced rhythm, no concern for ACS.  She denies any orthopnea, PND, or leg swelling.  No palpitations.    Medications / Drug list prior to admission:  No current facility-administered medications on file prior to encounter.     Current Outpatient Medications on File Prior to Encounter   Medication Sig Dispense Refill    lisinopril (PRINIVIL) 5 MG Tab Take 0.5 Tabs by mouth every day. Needs to be seen for further refills. Thank you 15 Tab 0    XARELTO 20 MG Tab tablet TAKE ONE TABLET DAILY WITH DINNER. 90 Tab 2    CARTIA  MG CAPSULE SR 24 HR TAKE ONE CAPSULE ORALLY EACH DAY. 90 Cap 0    flecainide (TAMBOCOR) 100 MG Tab TAKE 1 TABLET TWICE DAILY. 180 Tab 1    diltiazem (DILACOR XR) 180 MG XR capsule Take 1 Cap by mouth every day. 90 Cap 3    atorvastatin (LIPITOR) 40 MG TABS Take 40 mg by mouth every evening.      hydrocodone-acetaminophen (NORCO) 5-325 MG TABS per tablet Take 1-2 Tabs by mouth every four hours as needed.      albuterol (VENTOLIN OR PROVENTIL)  "108 (90 BASE) MCG/ACT AERS inhalation aerosol Inhale 2 Puffs by mouth every 6 hours as needed for Shortness of Breath.      tramadol (ULTRAM) 50 MG TABS Take  mg by mouth 3 times a day.      Fluticasone Furoate (VERAMYST NA) Spray 2 Puffs in nose every day.      Polyethylene Glycol 3350 (MIRALAX PO) Take  by mouth every day.      digoxin (LANOXIN) 0.25 MG TABS Take 250 mcg by mouth every day.      gabapentin (NEURONTIN) 800 MG tablet Take 800 mg by mouth 4 times a day.      docusate calcium (SURFAK) 240 MG CAPS Take 240 mg by mouth 2 times a day.        estradiol (ESTRACE) 1 MG TABS Take 1 mg by mouth every day. Dose not listed. Frequency is 23 duration       clonazepam (KLONOPIN) 1 MG TABS Take 0.5 mg by mouth 3 times a day.        insulin glargine (LANTUS) 100 UNIT/ML SOLN Inject  as instructed every evening.        metformin (GLUCOPHAGE) 500 MG TABS Take 500 mg by mouth 2 times a day, with meals. Pt doesn't remember dosage. \" it doesn't work anyway'       risperidone (RISPERDAL) 0.5 MG TABS Take 0.25 mg by mouth 2 times a day.           Current list of administered Medications:  No current facility-administered medications for this encounter.    Current Outpatient Medications:     lisinopril (PRINIVIL) 5 MG Tab, Take 0.5 Tabs by mouth every day. Needs to be seen for further refills. Thank you, Disp: 15 Tab, Rfl: 0    XARELTO 20 MG Tab tablet, TAKE ONE TABLET DAILY WITH DINNER., Disp: 90 Tab, Rfl: 2    CARTIA  MG CAPSULE SR 24 HR, TAKE ONE CAPSULE ORALLY EACH DAY., Disp: 90 Cap, Rfl: 0    flecainide (TAMBOCOR) 100 MG Tab, TAKE 1 TABLET TWICE DAILY., Disp: 180 Tab, Rfl: 1    diltiazem (DILACOR XR) 180 MG XR capsule, Take 1 Cap by mouth every day., Disp: 90 Cap, Rfl: 3    atorvastatin (LIPITOR) 40 MG TABS, Take 40 mg by mouth every evening., Disp: , Rfl:     hydrocodone-acetaminophen (NORCO) 5-325 MG TABS per tablet, Take 1-2 Tabs by mouth every four hours as needed., Disp: , Rfl:     albuterol (VENTOLIN " "OR PROVENTIL) 108 (90 BASE) MCG/ACT AERS inhalation aerosol, Inhale 2 Puffs by mouth every 6 hours as needed for Shortness of Breath., Disp: , Rfl:     tramadol (ULTRAM) 50 MG TABS, Take  mg by mouth 3 times a day., Disp: , Rfl:     Fluticasone Furoate (VERAMYST NA), Spray 2 Puffs in nose every day., Disp: , Rfl:     Polyethylene Glycol 3350 (MIRALAX PO), Take  by mouth every day., Disp: , Rfl:     digoxin (LANOXIN) 0.25 MG TABS, Take 250 mcg by mouth every day., Disp: , Rfl:     gabapentin (NEURONTIN) 800 MG tablet, Take 800 mg by mouth 4 times a day., Disp: , Rfl:     docusate calcium (SURFAK) 240 MG CAPS, Take 240 mg by mouth 2 times a day.  , Disp: , Rfl:     estradiol (ESTRACE) 1 MG TABS, Take 1 mg by mouth every day. Dose not listed. Frequency is 23 duration , Disp: , Rfl:     clonazepam (KLONOPIN) 1 MG TABS, Take 0.5 mg by mouth 3 times a day.  , Disp: , Rfl:     insulin glargine (LANTUS) 100 UNIT/ML SOLN, Inject  as instructed every evening.  , Disp: , Rfl:     metformin (GLUCOPHAGE) 500 MG TABS, Take 500 mg by mouth 2 times a day, with meals. Pt doesn't remember dosage. \" it doesn't work anyway' , Disp: , Rfl:     risperidone (RISPERDAL) 0.5 MG TABS, Take 0.25 mg by mouth 2 times a day.  , Disp: , Rfl:     Past Medical History:   Diagnosis Date    Atrial flutter (HCC) 7/15/2013    Bipolar affective (HCC) 4/19/2013    Cardiac pacemaker in situ 7/15/2013    Chronic pain 4/19/2013    Diabetes mellitus type II 4/19/2013    Precordial chest pain 7/15/2013    Pulmonary hypertension (HCC) 5/16/2014    Mild on echo of 4/2014     Sick sinus syndrome (HCC) 2/2/2013    Tachy-kamran syndrome (HCC) 2/2/2013       Past Surgical History:   Procedure Laterality Date    BLADDER SUSPENSION      HYSTERECTOMY, VAGINAL      PACEMAKER INSERTION         Family History   Problem Relation Age of Onset    Heart Disease Mother     Heart Attack Mother      Patient family history was personally reviewed, no pertinent family " "history to current presentation    Social History     Tobacco Use    Smoking status: Never    Smokeless tobacco: Never   Vaping Use    Vaping Use: Never used   Substance Use Topics    Alcohol use: No    Drug use: Never       ALLERGIES:  Allergies   Allergen Reactions    Septra [Bactrim]     Tape     Tegretol [Carbamazepine]     Digoxin Vomiting     When taken with antibiotics         Review of systems:  A complete review of symptoms was reviewed with patient. This is reviewed in H&P and PMH. ALL OTHERS reviewed and negative    Physical exam:  Patient Vitals for the past 24 hrs:   BP Temp Temp src Pulse Resp SpO2 Height Weight   08/26/22 1930 (!) 90/64 36.6 °C (97.8 °F) Temporal 74 18 96 % -- --   08/26/22 1925 -- -- -- -- -- -- 1.575 m (5' 2\") 61.7 kg (136 lb)     General: No acute distress.   EYES: no jaundice  HEENT: OP clear   Neck: No bruits No JVD.   CVS:  RRR. S1 + S2. No M/R/G  Resp: CTAB. No wheezing or crackles/rhonchi.  Abdomen: Soft, NT, ND,  Skin: Grossly nothing acute no obvious rashes  Neurological: Alert, Moves all extremities, no cranial nerve defects on limited exam  Extremities: Pulse 2+ in b/l LE.  No edema. No cyanosis.       Data:  Laboratory studies personally reviewed by me:  Recent Results (from the past 24 hour(s))   CBC w/ Differential    Collection Time: 08/26/22  7:10 PM   Result Value Ref Range    WBC 13.1 (H) 4.8 - 10.8 K/uL    RBC 4.64 4.20 - 5.40 M/uL    Hemoglobin 13.9 12.0 - 16.0 g/dL    Hematocrit 39.8 37.0 - 47.0 %    MCV 85.8 81.4 - 97.8 fL    MCH 30.0 27.0 - 33.0 pg    MCHC 34.9 33.6 - 35.0 g/dL    RDW 39.8 35.9 - 50.0 fL    Platelet Count 362 164 - 446 K/uL    MPV 9.3 9.0 - 12.9 fL    Neutrophils-Polys 74.70 (H) 44.00 - 72.00 %    Lymphocytes 19.30 (L) 22.00 - 41.00 %    Monocytes 4.90 0.00 - 13.40 %    Eosinophils 0.50 0.00 - 6.90 %    Basophils 0.20 0.00 - 1.80 %    Immature Granulocytes 0.40 0.00 - 0.90 %    Nucleated RBC 0.00 /100 WBC    Neutrophils (Absolute) 9.75 (H) 2.00 " - 7.15 K/uL    Lymphs (Absolute) 2.52 1.00 - 4.80 K/uL    Monos (Absolute) 0.64 0.00 - 0.85 K/uL    Eos (Absolute) 0.07 0.00 - 0.51 K/uL    Baso (Absolute) 0.03 0.00 - 0.12 K/uL    Immature Granulocytes (abs) 0.05 0.00 - 0.11 K/uL    NRBC (Absolute) 0.00 K/uL   EKG (NOW)    Collection Time: 22  7:13 PM   Result Value Ref Range    Report       [facility]    Test Date:  2022  Pt Name:    ANNIE CHO                    Department: ER  MRN:        [id number]                      Room:        10  Gender:     Female                       Technician: [id number]  :        1955                   Requested By:ER TRIAGE PROTOCOL  Order #:    [id number]                    Reading MD:    Measurements  Intervals                                Axis  Rate:       70                           P:          -82  SD:         46                           QRS:        -79  QRSD:       250                          T:          97  QT:         621  QTc:        671    Interpretive Statements  A-V dual-paced rhythm with some inhibition  No further analysis attempted due to paced rhythm  Compared to ECG 2013 11:21:19  Atrial fibrillation no longer present         Imaging:  DX-CHEST-LIMITED (1 VIEW)   Final Result         No acute cardiac or pulmonary abnormality is identified.      CT-FOREIGN FILM CAT SCAN   Final Result      OUTSIDE IMAGES-CT HEAD   Final Result      OUTSIDE IMAGES-DX CHEST   Final Result      [id number] FILM X-RAY   Final Result              EKG 2022: personally reviewed by me AV dual paced rhythm    All pertinent features of laboratory and imaging reviewed including primary images where applicable      Active Problems:    Syncope POA: Unknown    Pain in the chest POA: Unknown  Resolved Problems:    * No resolved hospital problems. *      Assessment / Plan:    Syncope/fall  Unclear etiology  -Obtain echocardiogram to assess LVEF and any structural  abnormalities  -Monitor on telemetry for any arrhythmias  -Interrogate device    Chest pain  Reportedly had mild troponin elevation at the outside hospital  -Trend troponin serially  -Obtain echocardiogram as above  -The patient does have risk factor for coronary artery disease.  Depending on troponin trend, consider nuclear stress test versus coronary angiogram.  Keep n.p.o. after midnight.      I personally discussed her case with  Dr Malachi Maurice M.D.    It is my pleasure to participate in the care of Ms. Vuong.  Please do not hesitate to contact me with questions or concerns.    Zuri Duncan MD   Cardiologist [facility]    8/26/2022    Please note that this dictation was created using voice recognition software. There may be errors I did not discover before finalizing the note.

## 2022-08-27 NOTE — ED NOTES
pts daughter Ellie Talbot [phone], states pt is not acting like herself, states that pt does not have help at home to help her regulate her blood sugar. Pt states her doctor denied pt home health care, pt was admitted in February for hyperglycemia.

## 2022-08-27 NOTE — THERAPY
Missed Therapy     Patient Name: Feli Vuong  Age:  66 y.o., Sex:  female  Medical Record #: [id number]  Today's Date: 8/27/2022 08/27/22 0846   Treatment Variance   Reason For Missed Therapy Medical - Other (Please Comment)  (NPO for stress test)   Interdisciplinary Plan of Care Collaboration   IDT Collaboration with  Nursing;Other (See Comments)  (EMR)   Collaboration Comments Orders received for Clinical Swallow Evaluation. Per EMR and discussion with RN, pt is currently NPO for stress test (currently scheduled for 11:00). SLP will continue to follow and evaluate at next available opportunity.

## 2022-08-27 NOTE — ASSESSMENT & PLAN NOTE
Patient states she cannot remember the last time she took her medications, in regards to cardiac meds on her MAR metoprolol, diltiazem, digoxin, flecainide, Xarelto.  Cardiology consulted in ED and following, can discuss with them in the morning in regards to an appropriate regimen.  Check dig level.  Monitor on telemetry  8/30/2022:  Continue patient's home dosing of Xarelto continue present cardiac medications.

## 2022-08-27 NOTE — PROGRESS NOTES
4 Eyes Skin Assessment Completed by GEROSN Burnett and GERSON Benjamin.    Head WDL  Ears WDL  Nose WDL  Mouth WDL chapped lips  Neck WDL  Breast/Chest WDL  Shoulder Blades WDL  Spine WDL  (R) Arm/Elbow/Hand WDL  (L) Arm/Elbow/Hand WDL scar axilla/flank area  Abdomen WDL  Groin WDL  Scrotum/Coccyx/Buttocks Redness and Blanching  (R) Leg WDL  (L) Leg WDL  (R) Heel/Foot/Toe WDL  (L) Heel/Foot/Toe WDL          Devices In Places Tele Box, Blood Pressure Cuff, and Pulse Ox      Interventions In Place N/A    Possible Skin Injury No    Pictures Uploaded Into Epic N/A  Wound Consult Placed N/A  RN Wound Prevention Protocol Ordered No

## 2022-08-27 NOTE — PROGRESS NOTES
Dr. Moser notified of BP 68/42. Consistently low after multiple rechecks. Order for 1L LR received.

## 2022-08-27 NOTE — ED NOTES
Med rec completed per medication list provided by EMS  Patient stated she does not know what medication she is taking  Patient stated she has not been taking her medications for a long time, as she is not sure what she is supposed to be taking  Patient stated no one is helping her with her medications  Unknown last doses, patient was not able to advise how long she has not been taking her medications  Allergies reviewed

## 2022-08-27 NOTE — ASSESSMENT & PLAN NOTE
UTI + Pre-renal etiology with syncope and hypotension.    Rule out post obstruction  IVF  Renal dose meds and avoid nephrotoxins  Monitor I&O's  Resolved

## 2022-08-27 NOTE — ED NOTES
Pt laying in bed, eyes closed, equal chest rise noted, pt alert and oriented to self and location

## 2022-08-27 NOTE — PROGRESS NOTES
"Unable to complete stress test at this time.  Patient uncooperative and unable to sit for imaging with her left arm raised.  RN and Nuc Med Technologist offered to support her arm during imaging.  Pt declined, stating \"My arm is broken\".  Can attempt stress testing again in the morning (8/28).   "

## 2022-08-27 NOTE — ASSESSMENT & PLAN NOTE
While in ED BP trended down to the 60's. Responded to IVF bolus.  Given the hypotension and encephalopathy with somnolence   +UA, urine culture,  blood cultures, cortisol, started ceftriaxone for sepsis.  BP immediately improved with IVF bolus, mentation improved.   Cortisol checked and was low, 12. I will provide one dose of Solu-Cortef 100 mg IV but I suspect her hypotension was more from dehydration.  If any further episodes of hypotension continues with stress dose steroids.  8/30/2022:  -Resolved

## 2022-08-27 NOTE — ASSESSMENT & PLAN NOTE
Patient had symptoms speech difficulties reported facial droop concern for stroke at OSH CT neuroimaging to include angiography was negative for acute CVA/LVO/aneurysm/significant stenosis.  MRI, however pacemaker is not compatible with MRI, will need [employer] rep to shut off device for MRI and turn on post MRI.  Monitor on telemetry  Continue statin  8/30/2022:  Patient has code stroke called today CTA imaging and perfusion scans all of which were negative.  Continue to monitor clinically.  Neurology recommendations appreciated.

## 2022-08-27 NOTE — ASSESSMENT & PLAN NOTE
IV ceftriaxone, follow-up urine and blood cultures.  Appears sepsis with urinary source causing hypotension, syncope.  8/30/2022:  Discontinue ceftriaxone initiate Zosyn.  8/31/2022:  -Resolved

## 2022-08-27 NOTE — ASSESSMENT & PLAN NOTE
Mildly elevated 22, no ongoing chest pain, had mild chest pain on arrival since resolved.  EKG shows A-V dual-paced rhythm with some inhibition  Will trend  -Resolved

## 2022-08-27 NOTE — ED PROVIDER NOTES
ED Provider Note    CHIEF COMPLAINT  Chief Complaint   Patient presents with    Irregular Heart Beat     Transfer from [facility] fall at home c/c hip pain, EMS with facial droop resolved and -stroke, at hosp abnormal EKG with inf/lateral STEMI and increased Trop, +blood thinners, +pacemaker       HPI  Feli Vuong is a 66 y.o. female who presents as a transfer from [facility] with a possible ST segment elevation myocardial infarction.  The patient presented to [facility] after she was found down and altered.  EMS was contacted and the patient presented to [facility] initially as a possible stroke with slurred speech and a possible facial droop.  She had a CT scan of the head including angiography that was negative.  EKG was performed that was paced but read as an ST segment elevation myocardial infarction and she was transferred here emergently.  The patient states that she has had some intermittent difficulty with breathing.  She does not have any ongoing chest pain.  The patient's alcohol level is negligible and a Tylenol and salicylate level was checked as well which were negative.    REVIEW OF SYSTEMS  See HPI for further details. All other systems are negative.     PAST MEDICAL HISTORY  Past Medical History:   Diagnosis Date    Pulmonary hypertension 5/16/2014    Mild on echo of 4/2014     Precordial chest pain 7/15/2013    Cardiac pacemaker in situ 7/15/2013    Atrial flutter (CMS-[facility]) 7/15/2013    Diabetes mellitus type II 4/19/2013    Bipolar affective (CMS-[facility]) 4/19/2013    Chronic pain 4/19/2013    Sick sinus syndrome (CMS-[facility]) 2/2/2013    Tachy-kamran syndrome (CMS-[facility]) 2/2/2013       FAMILY HISTORY  [unfilled]    SOCIAL HISTORY  Social History     Socioeconomic History    Marital status: Single   Tobacco Use    Smoking status: Never    Smokeless tobacco: Never   Substance and Sexual Activity    Alcohol use: No       SURGICAL HISTORY  Past Surgical History:   Procedure  "Laterality Date    BLADDER SUSPENSION      HYSTERECTOMY, VAGINAL      PACEMAKER INSERTION         CURRENT MEDICATIONS  Home Medications    **Home medications have not yet been reviewed for this encounter**         ALLERGIES  Allergies   Allergen Reactions    Septra [Bactrim]     Tape     Tegretol [Carbamazepine]     Digoxin Vomiting     When taken with antibiotics         PHYSICAL EXAM  VITAL SIGNS: BP (!) 90/64   Pulse 74   Temp 36.6 °C (97.8 °F) (Temporal)   Resp 18   Ht 1.575 m (5' 2\")   Wt 61.7 kg (136 lb)   SpO2 96%   BMI 24.87 kg/m²       Constitutional: Patient appears sedated  HENT: Normocephalic, Atraumatic, Bilateral external ears normal, Oropharynx moist, No oral exudates, Nose normal.   Eyes: PERRLA, EOMI, Conjunctiva normal, No discharge.   Neck: Normal range of motion, No tenderness, Supple, No stridor.   Lymphatic: No lymphadenopathy noted.   Cardiovascular: Normal heart rate, Normal rhythm, No murmurs, No rubs, No gallops.   Thorax & Lungs: Normal breath sounds, No respiratory distress, No wheezing, No chest tenderness.   Abdomen: Bowel sounds normal, Soft, No tenderness, No masses, No pulsatile masses.   Skin: Warm, Dry, No erythema, No rash.   Back: No tenderness, No CVA tenderness.   Extremities: Intact distal pulses, No edema, No tenderness, No cyanosis, No clubbing.   Neurologic: Alert & oriented x 3, Normal motor function, Normal sensory function, No focal deficits noted.   Psychiatric: Affect normal, Judgment normal, Mood normal.     Results for orders placed or performed during the hospital encounter of 08/26/22   CBC w/ Differential   Result Value Ref Range    WBC 13.1 (H) 4.8 - 10.8 K/uL    RBC 4.64 4.20 - 5.40 M/uL    Hemoglobin 13.9 12.0 - 16.0 g/dL    Hematocrit 39.8 37.0 - 47.0 %    MCV 85.8 81.4 - 97.8 fL    MCH 30.0 27.0 - 33.0 pg    MCHC 34.9 33.6 - 35.0 g/dL    RDW 39.8 35.9 - 50.0 fL    Platelet Count 362 164 - 446 K/uL    MPV 9.3 9.0 - 12.9 fL    Neutrophils-Polys 74.70 (H) " 44.00 - 72.00 %    Lymphocytes 19.30 (L) 22.00 - 41.00 %    Monocytes 4.90 0.00 - 13.40 %    Eosinophils 0.50 0.00 - 6.90 %    Basophils 0.20 0.00 - 1.80 %    Immature Granulocytes 0.40 0.00 - 0.90 %    Nucleated RBC 0.00 /100 WBC    Neutrophils (Absolute) 9.75 (H) 2.00 - 7.15 K/uL    Lymphs (Absolute) 2.52 1.00 - 4.80 K/uL    Monos (Absolute) 0.64 0.00 - 0.85 K/uL    Eos (Absolute) 0.07 0.00 - 0.51 K/uL    Baso (Absolute) 0.03 0.00 - 0.12 K/uL    Immature Granulocytes (abs) 0.05 0.00 - 0.11 K/uL    NRBC (Absolute) 0.00 K/uL   Complete Metabolic Panel (CMP)   Result Value Ref Range    Sodium 139 135 - 145 mmol/L    Potassium 3.2 (L) 3.6 - 5.5 mmol/L    Chloride 108 96 - 112 mmol/L    Co2 18 (L) 20 - 33 mmol/L    Anion Gap 13.0 7.0 - 16.0    Glucose 112 (H) 65 - 99 mg/dL    Bun 29 (H) 8 - 22 mg/dL    Creatinine 1.46 (H) 0.50 - 1.40 mg/dL    Calcium 10.1 8.5 - 10.5 mg/dL    AST(SGOT) 17 12 - 45 U/L    ALT(SGPT) 16 2 - 50 U/L    Alkaline Phosphatase 74 30 - 99 U/L    Total Bilirubin 0.3 0.1 - 1.5 mg/dL    Albumin 3.7 3.2 - 4.9 g/dL    Total Protein 7.0 6.0 - 8.2 g/dL    Globulin 3.3 1.9 - 3.5 g/dL    A-G Ratio 1.1 g/dL   proBrain Natriuretic Peptide, NT   Result Value Ref Range    NT-proBNP 787 (H) 0 - 125 pg/mL   Troponin STAT   Result Value Ref Range    Troponin T 21 (H) 6 - 19 ng/L   Lipase   Result Value Ref Range    Lipase 19 11 - 82 U/L   PT/INR   Result Value Ref Range    PT 13.1 12.0 - 14.6 sec    INR 1.00 0.87 - 1.13   APTT   Result Value Ref Range    APTT 26.4 24.7 - 36.0 sec   DIAGNOSTIC ALCOHOL   Result Value Ref Range    Diagnostic Alcohol <10.1 <10.1 mg/dL   ESTIMATED GFR   Result Value Ref Range    GFR (CKD-EPI) 39 (A) >60 mL/min/1.73 m 2   EKG (NOW)   Result Value Ref Range    Report       [facility] Emergency Dept.    Test Date:  2022-08-26  Pt Name:    ANNIE CHO                    Department: ER  MRN:        [id number]                      Room:       RD 10  Gender:     Female                        Technician: [id number]  :        [id number]                   Requested By:ER TRIAGE PROTOCOL  Order #:    [id number]                    Reading MD:    Measurements  Intervals                                Axis  Rate:       70                           P:          -82  PA:         46                           QRS:        -79  QRSD:       250                          T:          97  QT:         621  QTc:        671    Interpretive Statements  A-V dual-paced rhythm with some inhibition  No further analysis attempted due to paced rhythm  Compared to ECG 2013 11:21:19  Atrial fibrillation no longer present         RADIOLOGY/PROCEDURES  DX-CHEST-LIMITED (1 VIEW)   Final Result         No acute cardiac or pulmonary abnormality is identified.      CT-FOREIGN FILM CAT SCAN   Final Result      OUTSIDE IMAGES-CT HEAD   Final Result      OUTSIDE IMAGES-DX CHEST   Final Result      NO-WQABSMB-QMBCMOO FILM X-RAY   Final Result            COURSE & MEDICAL DECISION MAKING  Pertinent Labs & Imaging studies reviewed. (See chart for details)  This a 66-year-old female who presents the emergency department as a transfer for a possible ST segment elevation myocardial infarction.  On arrival in the trauma bay the EKG was reviewed and this is a paced rhythm therefore it is difficult to interpret from an acute ST elevation standpoint.  Her troponin was 0.04 from the transferring facility.  She is had some shortness of breath but she does not have any ongoing chest pain.  I suspect this to be a very unlikely cause of her altered mental status and the reason she was found down.  I did review the CT scan of her head as well as angiogram of the head and neck with no significant abnormality that could cause her to be altered.  Her alcohol level was negligible per the report from the transferring physician.  She is on a lot of medication and I suspect this could be iatrogenic or inadvertent overdose of her medication.  She has  more appropriate this time but she does appear sedated.  Cardiology has consulted on the case and recommended echocardiogram and serial troponins.  Her troponin is slightly elevated at 21 but again she does not have any ongoing chest pain.  She does have some mild renal insufficiency and has received a fluid bolus and her blood pressure has seemed to stabilize.  The patient will be admitted to the hospital for further work-up she is resting comfortably.    FINAL IMPRESSION  1.  Altered mental status  2.  Elevated troponin rule out acute coronary syndrome  3.  Renal insufficiency    Disposition  The patient will be admitted in stable condition         Electronically signed by: Malachi Maurice M.D., 8/26/2022 7:23 PM

## 2022-08-27 NOTE — ASSESSMENT & PLAN NOTE
Hypotensive in ER 2/2 sepsis-urinary source.  Monitor on telemetry, check orthostatic vitals, TTE.  IV fluid  -Resolved

## 2022-08-27 NOTE — PROGRESS NOTES
TOA 1910    Patient presented to other hospital at approx. 1430.  Fell at home, unknown downtime, found by roommate.  STEMI initiated prior to transfer for elevated troponins and ST elevation.  Patient pacing at other hospital and pacing upon arrival.       STEMI cancelled at 1915 by Dr. Franco.

## 2022-08-27 NOTE — ASSESSMENT & PLAN NOTE
-CT imaging to include angiography negative at OSH for CVA/LVO/stenosis/aneurysm, will obtain MRI  -vitals and neuro checks q4h  -IVF  -Limit sedatives, attempt to minimize risk of delirium such as avoiding day time napping and promote night time sleep, frequent reorientation, monitor for constipation, remove lines/tubing not needed, avoid early lab draws and vital checks, limit polypharmacy as able, and keep close to the window  -Aspiration precautions  HIV, TSH, RPR ordered  8/30/2022:  Continue present medical therapy unclear etiology of patient's encephalopathy patient had been taking high-dose gabapentin at home 3600 mg reasons to lower dose gabapentin while hospitalized provided intramuscular diazepam as patient does not have IV access at present.  Patient does have significant psychiatric history.

## 2022-08-27 NOTE — DISCHARGE PLANNING
STEMI Response    Referral: Code STEMI Response    Intervention: MSW responded to possible STEMI.  Pt was BIB [facility] after possible STEMI.  Pt was alert upon arrival.  Pts name is Feli Vuong (: 51).  SW obtained the following pt information: the pt stated she would like her daughter listed as her emergency contact.     Ellie Talbot (daughter) [phone]    Plan: SW will remain available for support.

## 2022-08-27 NOTE — ED NOTES
Am meds provided to pt, pt alert to situation, location, name and date. Pt appears more alert now than on arrival.  Pt states her left arm hurts when she is trying to move it.

## 2022-08-27 NOTE — ASSESSMENT & PLAN NOTE
This is Sepsis Present on admission  SIRS criteria identified on my evaluation include: Tachycardia, with heart rate greater than 90 BPM, Leukocytosis, with WBC greater than 12,000 and Bandemia, greater than 10% bands  Source is urinary  Sepsis protocol initiated  Fluid resuscitation ordered per protocol  Crystalloid Fluid Administration: Fluid resuscitation ordered per standard protocol - 30 mL/kg per current or ideal body weight  IV antibiotics as appropriate for source of sepsis  Reassessment: I have reassessed the patient's hemodynamic status  BCs x2   UCx sent  8/30/2022:  Discontinue ceftriaxone to start Zosyn chest x-ray presently pending lactic acid pending  8/31/2022:  -Resolved

## 2022-08-27 NOTE — PROGRESS NOTES
Received bedside report from RN, pt care assumed, VSS, pt assessment complete. Pt AAOx 3, disoriented to time, with no c/o of pain at this time. No signs of acute distress noted at this time. Plan of care discussed with pt and verbalizes no questions. Pt denies any additional needs at this time. Bed locked/in lowest position, bed alarm on, pt educated on fall risk and verbalized understanding, call light within reach, hourly rounding initiated.

## 2022-08-27 NOTE — ED NOTES
Pt alert able to make needs known, pt requested to have bed pan to pass urine, pt able to move all extremities to assist with bedpan placement

## 2022-08-27 NOTE — PROGRESS NOTES
Hospital Medicine Daily Progress Note    Date of Service  8/27/2022    Chief Complaint  Feli Vuong is a 66 y.o. female admitted 8/26/2022 with urinary source sepsis with hypotension.    Hospital Course  Feli Vuong is a 66 y.o. female with extensive medical history to include hypertension, hyperlipidemia, paroxysmal A. Flutter, diabetes mellitus, hypertension, hyperlipidemia, bipolar affective disorder, sick sinus syndrome status post pacemaker placement who presented 8/26/2022 as a transfer from OSH for STEMI (cardiology determined no STEMI).     Patient presented to [facility] after being found on the ground syncope versus ground-level fall (story is unclear) with complaint of right hip pain.  Reports that she was having slurred speech and possible facial droop and there was concerns for stroke.  On chart review she was not reporting any chest pain on arrival to OSH.  EKG had concerns of STEMI, she was given 1 L normal saline for soft blood sugars and full dose aspirin, troponin noted to be 0.04 Pro-Jj 0.15 elevated BUN/serum creatinine.  Patient was sent to [facility] for further evaluation.  At their facility she underwent CT head without negative for acute intracranial abnormality, CTA head and neck negative for CVA, LVO, aneurysm.  Right hip x-ray negative for acute fracture or dislocation.  EKG AV dual paced rhythm, no STEMI.   Patient hypotensive and UA +, diagnosed with sepsis.  BCs, UCx sent, on Rocephin IV.  Responding to IVFs.  MARCE improving and BP improved.  Patient c/o dysuria at home, lives alone.  Given solumedrol for acute stress related adrenal crises.    Interval Problem Update  8/27:  continue IVFs and antibiotics.  Cr improving 1.46 to 1.29.  wbc 13.  Patient meets inpatient criteria for severe sepsis.  Patient awakens to tell me that she has had bladder pain for the past week.  Burning with urination.  She lives alone.  It is unclear why she did not seek medical attention  sooner.  I do not detect any gross neurological deficit on exam.    I have discussed this patient's plan of care and discharge plan at IDT rounds today with Case Management, Nursing, Nursing leadership, and other members of the IDT team.    Consultants/Specialty  cardiology    Code Status  Full Code    Disposition  Patient is not medically cleared for discharge.   Anticipate discharge to to home with close outpatient follow-up.  I have placed the appropriate orders for post-discharge needs.    Review of Systems  Review of Systems   Constitutional:  Positive for chills, fever and malaise/fatigue. Negative for diaphoresis.   HENT:  Negative for congestion and sore throat.    Eyes:  Negative for pain and discharge.   Respiratory:  Negative for cough, hemoptysis, sputum production, shortness of breath and wheezing.    Cardiovascular:  Negative for chest pain, palpitations, claudication and leg swelling.   Gastrointestinal:  Negative for abdominal pain, constipation, diarrhea, melena, nausea and vomiting.   Genitourinary:  Positive for dysuria, frequency and urgency.   Musculoskeletal:  Positive for falls and myalgias. Negative for back pain, joint pain and neck pain.   Skin:  Negative for itching and rash.   Neurological:  Positive for loss of consciousness. Negative for dizziness, sensory change, speech change, focal weakness, weakness and headaches.   Endo/Heme/Allergies:  Does not bruise/bleed easily.   Psychiatric/Behavioral:  Negative for depression, substance abuse and suicidal ideas.       Physical Exam  Temp:  [36.6 °C (97.8 °F)-37.1 °C (98.7 °F)] 37.1 °C (98.7 °F)  Pulse:  [60-74] 61  Resp:  [12-20] 16  BP: ()/(42-83) 139/63  SpO2:  [93 %-98 %] 96 %    Physical Exam  Vitals and nursing note reviewed.   Constitutional:       General: She is not in acute distress.     Appearance: She is well-developed and normal weight. She is ill-appearing. She is not diaphoretic.   HENT:      Head: Normocephalic and  atraumatic.      Nose: Nose normal.      Mouth/Throat:      Pharynx: No oropharyngeal exudate.   Eyes:      General: No scleral icterus.        Right eye: No discharge.         Left eye: No discharge.      Conjunctiva/sclera: Conjunctivae normal.      Pupils: Pupils are equal, round, and reactive to light.   Neck:      Thyroid: No thyromegaly.      Vascular: No JVD.      Trachea: No tracheal deviation.   Cardiovascular:      Rate and Rhythm: Normal rate. Rhythm irregular.      Heart sounds: Normal heart sounds. No murmur heard.    No friction rub. No gallop.      Comments: Pacemaker pocket nonerythematous  Pulmonary:      Effort: Pulmonary effort is normal. No respiratory distress.      Breath sounds: Normal breath sounds. No stridor. No wheezing or rales.   Chest:      Chest wall: No tenderness.   Abdominal:      General: Bowel sounds are normal. There is no distension.      Palpations: Abdomen is soft. There is no mass.      Tenderness: There is no abdominal tenderness. There is no guarding or rebound.   Genitourinary:     Comments: Newby with dark cloudy urine  Musculoskeletal:         General: No tenderness. Normal range of motion.      Cervical back: Normal range of motion and neck supple.   Lymphadenopathy:      Cervical: No cervical adenopathy.   Skin:     General: Skin is warm and dry.      Findings: No erythema or rash.   Neurological:      General: No focal deficit present.      Mental Status: She is alert and oriented to person, place, and time.      Cranial Nerves: No cranial nerve deficit.      Motor: No abnormal muscle tone.      Coordination: Coordination normal.   Psychiatric:         Mood and Affect: Mood normal.         Behavior: Behavior normal.         Thought Content: Thought content normal.         Judgment: Judgment normal.       Fluids  No intake or output data in the 24 hours ending 08/27/22 1359    Laboratory  Recent Labs     08/26/22  1910 08/27/22  0942   WBC 13.1* 18.1*   RBC 4.64 4.80    HEMOGLOBIN 13.9 14.3   HEMATOCRIT 39.8 42.3   MCV 85.8 88.1   MCH 30.0 29.8   MCHC 34.9 33.8   RDW 39.8 41.4   PLATELETCT 362 377   MPV 9.3 9.0     Recent Labs     08/26/22 1910 08/27/22  0130   SODIUM 139 141   POTASSIUM 3.2* 3.5*   CHLORIDE 108 108   CO2 18* 19*   GLUCOSE 112* 52*   BUN 29* 27*   CREATININE 1.46* 1.29   CALCIUM 10.1 9.9     Recent Labs     08/26/22 1910   APTT 26.4   INR 1.00               Imaging  DX-CHEST-LIMITED (1 VIEW)   Final Result         No acute cardiac or pulmonary abnormality is identified.      CT-FOREIGN FILM CAT SCAN   Final Result      OUTSIDE IMAGES-CT HEAD   Final Result      OUTSIDE IMAGES-DX CHEST   Final Result      FV-RVFOIAM-WBAQMUL FILM X-RAY   Final Result      MR-BRAIN-W/O    (Results Pending)   EC-ECHOCARDIOGRAM COMPLETE W/O CONT    (Results Pending)   NM-CARDIAC STRESS TEST    (Results Pending)        Assessment/Plan  * Sepsis associated hypotension (HCC)- (present on admission)  Assessment & Plan  This is Sepsis Present on admission  SIRS criteria identified on my evaluation include: Tachycardia, with heart rate greater than 90 BPM, Leukocytosis, with WBC greater than 12,000 and Bandemia, greater than 10% bands  Source is urinary  Sepsis protocol initiated  Fluid resuscitation ordered per protocol  Crystalloid Fluid Administration: Fluid resuscitation ordered per standard protocol - 30 mL/kg per current or ideal body weight  IV antibiotics as appropriate for source of sepsis  Reassessment: I have reassessed the patient's hemodynamic status  BCs x2  UCx sent          Metabolic encephalopathy- (present on admission)  Assessment & Plan  -CT imaging to include angiography negative at OSH for CVA/LVO/stenosis/aneurysm, will obtain MRI  -vitals and neuro checks q4h  -IVF  -Limit sedatives, attempt to minimize risk of delirium such as avoiding day time napping and promote night time sleep, frequent reorientation, monitor for constipation, remove lines/tubing not needed,  avoid early lab draws and vital checks, limit polypharmacy as able, and keep close to the window  -Aspiration precautions           Hypotension- (present on admission)  Assessment & Plan  While in ED BP trended down to the 60's. Responded to IVF bolus.  Given the hypotension and encephalopathy with somnolence   +UA, urine culture,  blood cultures, cortisol, started ceftriaxone for sepsis.  BP immediately improved with IVF bolus, mentation improved.   Cortisol checked and was low, 12. I will provide one dose of Solu-Cortef 100 mg IV but I suspect her hypotension was more from dehydration.  If any further episodes of hypotension continues with stress dose steroids.    Acute cystitis- (present on admission)  Assessment & Plan  3 days ceftriaxone, follow-up urine and blood cultures.    Elevated troponin- (present on admission)  Assessment & Plan  Mildly elevated 22, no ongoing chest pain, had mild chest pain on arrival since resolved.  EKG shows A-V dual-paced rhythm with some inhibition  Will trend    Hypokalemia- (present on admission)  Assessment & Plan  Replacement ordered.     MARCE (acute kidney injury) (HCC)- (present on admission)  Assessment & Plan  UTI + Pre-renal etiology with syncope and hypotension.    Rule out post obstruction  IVF  Renal dose meds and avoid nephrotoxins  Monitor I&O's  Follow renal function      TIA (transient ischemic attack)- (present on admission)  Assessment & Plan  Patient had symptoms speech difficulties reported facial droop concern for stroke at OSH CT neuroimaging to include angiography was negative for acute CVA/LVO/aneurysm/significant stenosis.  MRI, however pacemaker is not compatible with MRI, will need [employer] rep to shut off device for MRI and turn on post MRI.  Monitor on telemetry  Continue statin    Syncope- (present on admission)  Assessment & Plan  Hypotensive in ER 2/2 sepsis-urinary source.  Monitor on telemetry, check orthostatic vitals, TTE.  IV  fluid    Paroxysmal atrial flutter (HCC)- (present on admission)  Assessment & Plan  Patient states she cannot remember the last time she took her medications, in regards to cardiac meds on her MAR metoprolol, diltiazem, digoxin, flecainide, Xarelto.  Cardiology consulted in ED and following, can discuss with them in the morning in regards to an appropriate regimen.  Check dig level.  Monitor on telemetry    Mixed hyperlipidemia- (present on admission)  Assessment & Plan  Continue statin    Bipolar affective (HCC)- (present on admission)  Assessment & Plan  Medications on MAR mirtazapine, lithium, risperidone, clonazepam, quetiapine although patient states she does not take any.      Sick sinus syndrome (HCC)- (present on admission)  Assessment & Plan  Status post PPM placement       VTE prophylaxis: heparin ppx    I have performed a physical exam and reviewed and updated ROS and Plan today (8/27/2022). In review of yesterday's note (8/26/2022), there are no changes except as documented above.
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
## 2022-08-28 LAB
ANION GAP SERPL CALC-SCNC: 9 MMOL/L (ref 7–16)
BASOPHILS # BLD AUTO: 0.3 % (ref 0–1.8)
BASOPHILS # BLD AUTO: 0.4 % (ref 0–1.8)
BASOPHILS # BLD: 0.04 K/UL (ref 0–0.12)
BASOPHILS # BLD: 0.04 K/UL (ref 0–0.12)
BUN SERPL-MCNC: 18 MG/DL (ref 8–22)
CALCIUM SERPL-MCNC: 10.2 MG/DL (ref 8.5–10.5)
CHLORIDE SERPL-SCNC: 105 MMOL/L (ref 96–112)
CO2 SERPL-SCNC: 24 MMOL/L (ref 20–33)
CREAT SERPL-MCNC: 1.1 MG/DL (ref 0.5–1.4)
EOSINOPHIL # BLD AUTO: 0.09 K/UL (ref 0–0.51)
EOSINOPHIL # BLD AUTO: 0.18 K/UL (ref 0–0.51)
EOSINOPHIL NFR BLD: 0.8 % (ref 0–6.9)
EOSINOPHIL NFR BLD: 1.4 % (ref 0–6.9)
ERYTHROCYTE [DISTWIDTH] IN BLOOD BY AUTOMATED COUNT: 41 FL (ref 35.9–50)
ERYTHROCYTE [DISTWIDTH] IN BLOOD BY AUTOMATED COUNT: 41.8 FL (ref 35.9–50)
GFR SERPLBLD CREATININE-BSD FMLA CKD-EPI: 54 ML/MIN/1.73 M 2
GLUCOSE BLD STRIP.AUTO-MCNC: 231 MG/DL (ref 65–99)
GLUCOSE BLD STRIP.AUTO-MCNC: 261 MG/DL (ref 65–99)
GLUCOSE BLD STRIP.AUTO-MCNC: 318 MG/DL (ref 65–99)
GLUCOSE SERPL-MCNC: 229 MG/DL (ref 65–99)
HCT VFR BLD AUTO: 37.7 % (ref 37–47)
HCT VFR BLD AUTO: 41.1 % (ref 37–47)
HGB BLD-MCNC: 12.9 G/DL (ref 12–16)
HGB BLD-MCNC: 13.9 G/DL (ref 12–16)
IMM GRANULOCYTES # BLD AUTO: 0.03 K/UL (ref 0–0.11)
IMM GRANULOCYTES # BLD AUTO: 0.05 K/UL (ref 0–0.11)
IMM GRANULOCYTES NFR BLD AUTO: 0.3 % (ref 0–0.9)
IMM GRANULOCYTES NFR BLD AUTO: 0.4 % (ref 0–0.9)
LYMPHOCYTES # BLD AUTO: 2.55 K/UL (ref 1–4.8)
LYMPHOCYTES # BLD AUTO: 3.14 K/UL (ref 1–4.8)
LYMPHOCYTES NFR BLD: 23.7 % (ref 22–41)
LYMPHOCYTES NFR BLD: 23.8 % (ref 22–41)
MCH RBC QN AUTO: 29.6 PG (ref 27–33)
MCH RBC QN AUTO: 30 PG (ref 27–33)
MCHC RBC AUTO-ENTMCNC: 33.8 G/DL (ref 33.6–35)
MCHC RBC AUTO-ENTMCNC: 34.2 G/DL (ref 33.6–35)
MCV RBC AUTO: 87.6 FL (ref 81.4–97.8)
MCV RBC AUTO: 87.7 FL (ref 81.4–97.8)
MONOCYTES # BLD AUTO: 0.72 K/UL (ref 0–0.85)
MONOCYTES # BLD AUTO: 1.18 K/UL (ref 0–0.85)
MONOCYTES NFR BLD AUTO: 6.7 % (ref 0–13.4)
MONOCYTES NFR BLD AUTO: 8.9 % (ref 0–13.4)
NEUTROPHILS # BLD AUTO: 7.28 K/UL (ref 2–7.15)
NEUTROPHILS # BLD AUTO: 8.66 K/UL (ref 2–7.15)
NEUTROPHILS NFR BLD: 65.3 % (ref 44–72)
NEUTROPHILS NFR BLD: 68 % (ref 44–72)
NRBC # BLD AUTO: 0 K/UL
NRBC # BLD AUTO: 0 K/UL
NRBC BLD-RTO: 0 /100 WBC
NRBC BLD-RTO: 0 /100 WBC
PLATELET # BLD AUTO: 345 K/UL (ref 164–446)
PLATELET # BLD AUTO: 353 K/UL (ref 164–446)
PMV BLD AUTO: 9.3 FL (ref 9–12.9)
PMV BLD AUTO: 9.8 FL (ref 9–12.9)
POTASSIUM SERPL-SCNC: 3.4 MMOL/L (ref 3.6–5.5)
RBC # BLD AUTO: 4.3 M/UL (ref 4.2–5.4)
RBC # BLD AUTO: 4.69 M/UL (ref 4.2–5.4)
SODIUM SERPL-SCNC: 138 MMOL/L (ref 135–145)
WBC # BLD AUTO: 10.7 K/UL (ref 4.8–10.8)
WBC # BLD AUTO: 13.3 K/UL (ref 4.8–10.8)

## 2022-08-28 PROCEDURE — 700111 HCHG RX REV CODE 636 W/ 250 OVERRIDE (IP): Performed by: HOSPITALIST

## 2022-08-28 PROCEDURE — A9270 NON-COVERED ITEM OR SERVICE: HCPCS | Performed by: STUDENT IN AN ORGANIZED HEALTH CARE EDUCATION/TRAINING PROGRAM

## 2022-08-28 PROCEDURE — 97535 SELF CARE MNGMENT TRAINING: CPT

## 2022-08-28 PROCEDURE — 97165 OT EVAL LOW COMPLEX 30 MIN: CPT

## 2022-08-28 PROCEDURE — 36415 COLL VENOUS BLD VENIPUNCTURE: CPT

## 2022-08-28 PROCEDURE — 80048 BASIC METABOLIC PNL TOTAL CA: CPT

## 2022-08-28 PROCEDURE — 700102 HCHG RX REV CODE 250 W/ 637 OVERRIDE(OP): Performed by: STUDENT IN AN ORGANIZED HEALTH CARE EDUCATION/TRAINING PROGRAM

## 2022-08-28 PROCEDURE — 770020 HCHG ROOM/CARE - TELE (206)

## 2022-08-28 PROCEDURE — 99233 SBSQ HOSP IP/OBS HIGH 50: CPT | Performed by: STUDENT IN AN ORGANIZED HEALTH CARE EDUCATION/TRAINING PROGRAM

## 2022-08-28 PROCEDURE — 85025 COMPLETE CBC W/AUTO DIFF WBC: CPT

## 2022-08-28 PROCEDURE — 97161 PT EVAL LOW COMPLEX 20 MIN: CPT

## 2022-08-28 PROCEDURE — 82962 GLUCOSE BLOOD TEST: CPT

## 2022-08-28 RX ORDER — POTASSIUM CHLORIDE 20 MEQ/1
40 TABLET, EXTENDED RELEASE ORAL ONCE
Status: COMPLETED | OUTPATIENT
Start: 2022-08-28 | End: 2022-08-28

## 2022-08-28 RX ADMIN — HEPARIN SODIUM 5000 UNITS: 5000 INJECTION, SOLUTION INTRAVENOUS; SUBCUTANEOUS at 14:38

## 2022-08-28 RX ADMIN — HEPARIN SODIUM 5000 UNITS: 5000 INJECTION, SOLUTION INTRAVENOUS; SUBCUTANEOUS at 05:59

## 2022-08-28 RX ADMIN — CEFTRIAXONE SODIUM 1 G: 10 INJECTION, POWDER, FOR SOLUTION INTRAVENOUS at 05:59

## 2022-08-28 RX ADMIN — ATORVASTATIN CALCIUM 80 MG: 80 TABLET, FILM COATED ORAL at 20:33

## 2022-08-28 RX ADMIN — INSULIN HUMAN 3 UNITS: 100 INJECTION, SOLUTION PARENTERAL at 08:52

## 2022-08-28 RX ADMIN — DOCUSATE SODIUM 50 MG AND SENNOSIDES 8.6 MG 2 TABLET: 8.6; 5 TABLET, FILM COATED ORAL at 05:59

## 2022-08-28 RX ADMIN — INSULIN GLARGINE-YFGN 25 UNITS: 100 INJECTION, SOLUTION SUBCUTANEOUS at 17:26

## 2022-08-28 RX ADMIN — INSULIN HUMAN 7 UNITS: 100 INJECTION, SOLUTION PARENTERAL at 12:14

## 2022-08-28 RX ADMIN — ASPIRIN 81 MG: 81 TABLET, COATED ORAL at 05:59

## 2022-08-28 RX ADMIN — DOCUSATE SODIUM 50 MG AND SENNOSIDES 8.6 MG 2 TABLET: 8.6; 5 TABLET, FILM COATED ORAL at 17:28

## 2022-08-28 RX ADMIN — HEPARIN SODIUM 5000 UNITS: 5000 INJECTION, SOLUTION INTRAVENOUS; SUBCUTANEOUS at 20:34

## 2022-08-28 RX ADMIN — POTASSIUM CHLORIDE 40 MEQ: 1500 TABLET, EXTENDED RELEASE ORAL at 12:40

## 2022-08-28 ASSESSMENT — COGNITIVE AND FUNCTIONAL STATUS - GENERAL
SUGGESTED CMS G CODE MODIFIER DAILY ACTIVITY: CH
SUGGESTED CMS G CODE MODIFIER MOBILITY: CJ
DAILY ACTIVITIY SCORE: 24
MOBILITY SCORE: 20
CLIMB 3 TO 5 STEPS WITH RAILING: A LITTLE
STANDING UP FROM CHAIR USING ARMS: A LITTLE
WALKING IN HOSPITAL ROOM: A LITTLE
MOVING FROM LYING ON BACK TO SITTING ON SIDE OF FLAT BED: A LITTLE

## 2022-08-28 ASSESSMENT — ENCOUNTER SYMPTOMS
FALLS: 1
LOSS OF CONSCIOUSNESS: 1
NECK PAIN: 0
CHILLS: 0
WEAKNESS: 0
FOCAL WEAKNESS: 0
SHORTNESS OF BREATH: 0
HEADACHES: 0
BACK PAIN: 0
CLAUDICATION: 0
CONSTIPATION: 0
FEVER: 0
DIARRHEA: 0
ABDOMINAL PAIN: 0
HEMOPTYSIS: 0
SENSORY CHANGE: 0
MYALGIAS: 1
WHEEZING: 0
SPUTUM PRODUCTION: 0
SPEECH CHANGE: 0
DIAPHORESIS: 0
PALPITATIONS: 0
COUGH: 0
NAUSEA: 0
DEPRESSION: 0
VOMITING: 0
DIZZINESS: 0

## 2022-08-28 ASSESSMENT — PATIENT HEALTH QUESTIONNAIRE - PHQ9
SUM OF ALL RESPONSES TO PHQ9 QUESTIONS 1 AND 2: 0
2. FEELING DOWN, DEPRESSED, IRRITABLE, OR HOPELESS: NOT AT ALL
1. LITTLE INTEREST OR PLEASURE IN DOING THINGS: NOT AT ALL

## 2022-08-28 ASSESSMENT — FIBROSIS 4 INDEX: FIB4 SCORE: 1

## 2022-08-28 ASSESSMENT — GAIT ASSESSMENTS
DISTANCE (FEET): 120
GAIT LEVEL OF ASSIST: SUPERVISED
ASSISTIVE DEVICE: FRONT WHEEL WALKER

## 2022-08-28 ASSESSMENT — LIFESTYLE VARIABLES: SUBSTANCE_ABUSE: 0

## 2022-08-28 ASSESSMENT — ACTIVITIES OF DAILY LIVING (ADL): TOILETING: INDEPENDENT

## 2022-08-28 NOTE — CARE PLAN
The patient is Watcher - Medium risk of patient condition declining or worsening    Shift Goals  Clinical Goals: stable neuro status, stable vital signs  Patient Goals: rest  Family Goals: N/A    Progress made toward(s) clinical / shift goals:  Patient is confused throughout the day, completely alert but AxOx3 at times, forgets she is in [place] but can remember she is in the hospital, sometimes confused about the date, pt up to the bathroom today with a walker, steady on her feet with the walker, hypertensive -160s, will continue to monitor       Problem: Knowledge Deficit - Stroke Education  Goal: Patient's knowledge of stroke and risk factors will improve  Outcome: Progressing     Problem: Psychosocial - Patient Condition  Goal: Patient's ability to verbalize feelings about condition will improve  Outcome: Progressing  Goal: Patient's ability to re-evaluate and adapt role responsibilities will improve  Outcome: Progressing     Problem: Neuro Status  Goal: Neuro status will remain stable or improve  Outcome: Progressing     Problem: Hemodynamic Monitoring  Goal: Patient's hemodynamics, fluid balance and neurologic status will be stable or improve  Outcome: Progressing     Problem: Respiratory - Stroke Patient  Goal: Patient will achieve/maintain optimum respiratory rate/effort  Outcome: Progressing     Problem: Dysphagia  Goal: Dysphagia will improve  Outcome: Progressing     Problem: Risk for Aspiration  Goal: Patient's risk for aspiration will be absent or decrease  Outcome: Progressing     Problem: Self Care  Goal: Patient will have the ability to perform ADLs independently or with assistance (bathe, groom, dress, toilet and feed)  Outcome: Progressing     Problem: Knowledge Deficit - Standard  Goal: Patient and family/care givers will demonstrate understanding of plan of care, disease process/condition, diagnostic tests and medications  Outcome: Progressing     Problem: Skin Integrity  Goal: Skin integrity  is maintained or improved  Outcome: Progressing     Problem: Pain - Standard  Goal: Alleviation of pain or a reduction in pain to the patient’s comfort goal  Outcome: Met  Flowsheets (Taken 8/28/2022 0800)  Pain Rating Scale (NPRS): 0     Problem: Urinary Elimination  Goal: Establish and maintain regular urinary output  Outcome: Met

## 2022-08-28 NOTE — THERAPY
"Occupational Therapy   Initial Evaluation     Patient Name: Feli Vuong  Age:  66 y.o., Sex:  female  Medical Record #: [id number]  Today's Date: 8/28/2022     Precautions  Precautions: Fall Risk    Assessment  Patient is 66 y.o. female admitted with extensive PMH including; HTN, hyperlipidemia, paroxysmal A. Flutter, diabetes mellitus, bipolar affective disorder, sick sinus syndrome s/p pacemaker placement. Pt presents to hospital after ground syncope vs GLF with complaints of R hip pain. Pt presents with minimal challenges completing dressing tasks and completion of grooming tasks in standing at the sink. Patient will not be actively followed for occupational therapy services at this time, however may be seen if requested by physician for 1 more visit within 30 days to address any discharge or equipment needs.     Plan    Recommend Occupational Therapy for Evaluation only.    DC Equipment Recommendations: Grab Bar(s) in Tub / Shower, Grab Bar(s) by Toilet, Tub / Shower Seat  Discharge Recommendations: Anticipate that the patient will have no further occupational therapy needs after discharge from the hospital        Objective       08/28/22 1010   Initial Contact Note    Initial Contact Note Order Received and Verified, Evaluation Only - Patient Does Not Require Further Acute Occupational Therapy at this Time.  However, May Benefit from Post Acute Therapy for Higher Level Functional Deficits.   Prior Living Situation   Housing / Facility 1 Story Apartment / Condo   Steps Into Home 2   Steps In Home 0   Equipment Owned Unable to Determine At This Time   Lives with - Patient's Self Care Capacity Adult Children   Comments pt reports that she lives with her grandson \"on and off\" and states that he assists her with tasks she needs hlep with, however, she reports being independent with ADL prior to admit to hospital   Prior Level of ADL Function   Self Feeding Independent   Grooming / Hygiene Independent   Bathing " Independent   Dressing Independent   Toileting Independent   History of Falls   History of Falls Yes   Date of Last Fall   (Fall is reason for admit; ground level fall vs syncope)   Precautions   Precautions Fall Risk   Cognition    Cognition / Consciousness X   Level of Consciousness Alert   Safety Awareness Impulsive   Attention   (Needs re-direction to complete tasks throughout evaluation)   Comments Pt is pleasant and cooperative requiring moderate verbal cuing to attend to tasks throughout evaluation as well as maintaining safety precautions with the use of FWW   Active ROM Upper Body   Active ROM Upper Body  X   Comments Pt has limitations to functional ROM of her L shoulder due a previous reported injury   Strength Upper Body   Upper Body Strength  X   Left  Impaired   Comments Pt has decreased  strength upon squeeze test for  strengthening   Balance Assessment   Sitting Balance (Static) Good   Sitting Balance (Dynamic) Good   Standing Balance (Static) Fair +   Standing Balance (Dynamic) Fair +   Weight Shift Sitting Good   Weight Shift Standing Fair   Comments use of FWW   Bed Mobility    Supine to Sit Supervised   ADL Assessment   Grooming Supervision;Standing   Upper Body Dressing Supervision   Lower Body Dressing Supervision   How much help from another person does the patient currently need...   Putting on and taking off regular lower body clothing? 4   Bathing (including washing, rinsing, and drying)? 4   Toileting, which includes using a toilet, bedpan, or urinal? 4   Putting on and taking off regular upper body clothing? 4   Taking care of personal grooming such as brushing teeth? 4   Eating meals? 4   6 Clicks Daily Activity Score 24   Functional Mobility   Sit to Stand Supervised   Bed, Chair, Wheelchair Transfer Supervised   Transfer Method Stand Step   Activity Tolerance   Sitting Edge of Bed 5 mins   Standing 10 mins   Comments fatigue, SOB   Education Group   Education Provided  Energy Conservation;Home Safety;Role of Occupational Therapist;Activities of Daily Living;Adaptive Equipment   Role of Occupational Therapist Patient Response Patient;Acceptance;Explanation   Energy Conservation Patient Response Patient;Acceptance;Explanation   Home Safety Patient Response Patient;Acceptance;Explanation;Reinforcement Needed   ADL Patient Response Patient;Acceptance;Explanation   Adaptive Equipment Patient Response Patient;Acceptance;Explanation   Problem List   Problem List Decreased Active Daily Living Skills;Decreased Homemaking Skills;Decreased Functional Mobility;Decreased Activity Tolerance;Safety Awareness Deficits / Cognition   Anticipated Discharge Equipment and Recommendations   DC Equipment Recommendations Grab Bar(s) in Tub / Shower;Grab Bar(s) by Toilet;Tub / Shower Seat   Discharge Recommendations Anticipate that the patient will have no further occupational therapy needs after discharge from the hospital   Interdisciplinary Plan of Care Collaboration   IDT Collaboration with  Nursing;Physical Therapist   Patient Position at End of Therapy In Bed;Bed Alarm On;Call Light within Reach;Tray Table within Reach;Phone within Reach   Collaboration Comments RN updated

## 2022-08-28 NOTE — THERAPY
"Physical Therapy   Initial Evaluation     Patient Name: Feli Vuong  Age:  66 y.o., Sex:  female  Medical Record #: [id number]  Today's Date: 8/28/2022     Assessment  Patient is a 66 y.o. female admitted with sepsis and acute cystitis, was transferred from [facility] with concern for STEMI following reports of GLF vs syncope at home. Pmhx includes DM, PPM, bipolar disorder, and a flutter. Pt seen for PT evaluation at this time. Appears close to her functional baseline, pt completed mobility with SPV and ambulated with FWW, no LOB. Pt reports no concerns with mobility and appears functionally capable of return home at this time. Encouraged continued amb with nursing to prevent deconditioning. Encouraged up to chair for meals. Patient will not be actively followed for physical therapy services at this time, however may be seen if requested by physician for 1 more visit within 30 days to address any discharge or equipment needs.    Plan  Recommend Physical Therapy for Evaluation only   DC Equipment Recommendations: None  Discharge Recommendations: Recommend home health for continued physical therapy services       08/28/22 1016   Prior Living Situation   Housing / Facility 1 Story Apartment / Condo   Steps Into Home 2   Steps In Home 0   Equipment Owned 4-Wheel Walker   Lives with -  Adult Children   Comments reports lives with her grandson \"on and off\" and states that he assists with IADLs. reports was indep with mobility and ADLs prior to admit   Prior Level of Functional Mobility   Bed Mobility Independent   Transfer Status Independent   Ambulation Independent   Distance Ambulation (Feet) community distances   Assistive Devices Used 4-Wheel Walker   Stairs Independent   Cognition    Level of Consciousness Alert   Comments pleasant and cooperative, odd affect and hx bipolar, needs redirection to attend to task   Balance Assessment   Sitting Balance (Static) Good   Sitting Balance (Dynamic) Good   Standing Balance (Static) Fair " +   Standing Balance (Dynamic) Fair +   Weight Shift Sitting Good   Weight Shift Standing Fair   Comments standing with FWW   Gait Analysis   Gait Level Of Assist Supervised   Assistive Device Front Wheel Walker   Distance (Feet) 120   Weight Bearing Status no restrictions   Comments no LOB   Bed Mobility    Supine to Sit Supervised   Sit to Supine Supervised   Scooting Supervised   Functional Mobility   Sit to Stand Supervised   Comments no LOB standing at sink for ADLs

## 2022-08-28 NOTE — PROGRESS NOTES
Hospital Medicine Daily Progress Note    Date of Service  8/28/2022    Chief Complaint  Feli Vuong is a 66 y.o. female admitted 8/26/2022 with urinary source sepsis with hypotension.    Hospital Course  Feli Vuong is a 66 y.o. female with extensive medical history to include hypertension, hyperlipidemia, paroxysmal A. Flutter, diabetes mellitus, hypertension, hyperlipidemia, bipolar affective disorder, sick sinus syndrome status post pacemaker placement who presented 8/26/2022 as a transfer from OSH for STEMI (cardiology determined no STEMI).     Patient presented to [facility] after being found on the ground syncope versus ground-level fall (story is unclear) with complaint of right hip pain.  Reports that she was having slurred speech and possible facial droop and there was concerns for stroke.  On chart review she was not reporting any chest pain on arrival to OSH.  EKG had concerns of STEMI, she was given 1 L normal saline for soft blood sugars and full dose aspirin, troponin noted to be 0.04 Pro-Jj 0.15 elevated BUN/serum creatinine.  Patient was sent to [facility] for further evaluation.  At their facility she underwent CT head without negative for acute intracranial abnormality, CTA head and neck negative for CVA, LVO, aneurysm.  Right hip x-ray negative for acute fracture or dislocation.  EKG AV dual paced rhythm, no STEMI.   Patient hypotensive and UA +, diagnosed with sepsis.  BCs, UCx sent, on Rocephin IV.  Responding to IVFs.  MARCE improving and BP improved.  Patient c/o dysuria at home, lives alone.  Given solumedrol for acute stress related adrenal crises.    Interval Problem Update  8/27:  continue IVFs and antibiotics.  Cr improving 1.46 to 1.29.  wbc 13.  Patient meets inpatient criteria for severe sepsis.  Patient awakens to tell me that she has had bladder pain for the past week.  Burning with urination.  She lives alone.  It is unclear why she did not seek medical attention  sooner.  I do not detect any gross neurological deficit on exam.  8/28: Patient reports still feeling confused but better than yesterday.  No further episodes of hypoglycemia but now she has hyperglycemia.  We will increase her insulin.  MRI pending and awaiting pacemaker rep.     I have discussed this patient's plan of care and discharge plan at IDT rounds today with Case Management, Nursing, Nursing leadership, and other members of the IDT team.    Consultants/Specialty  cardiology    Code Status  Full Code    Disposition  Patient is not medically cleared for discharge.   Anticipate discharge to to home with close outpatient follow-up.  I have placed the appropriate orders for post-discharge needs.    Review of Systems  Review of Systems   Constitutional:  Positive for malaise/fatigue. Negative for chills, diaphoresis and fever.   Respiratory:  Negative for cough, hemoptysis, sputum production, shortness of breath and wheezing.    Cardiovascular:  Negative for chest pain, palpitations, claudication and leg swelling.   Gastrointestinal:  Negative for abdominal pain, constipation, diarrhea, melena, nausea and vomiting.   Genitourinary:  Positive for dysuria, frequency and urgency.   Musculoskeletal:  Positive for falls and myalgias. Negative for back pain, joint pain and neck pain.   Neurological:  Positive for loss of consciousness. Negative for dizziness, sensory change, speech change, focal weakness, weakness and headaches.   Psychiatric/Behavioral:  Negative for depression, substance abuse and suicidal ideas.    All other systems reviewed and are negative.     Physical Exam  Temp:  [36.4 °C (97.5 °F)-37.1 °C (98.8 °F)] 36.4 °C (97.5 °F)  Pulse:  [60-70] 70  Resp:  [16-20] 17  BP: (101-158)/(56-87) 158/77  SpO2:  [92 %-97 %] 96 %    Physical Exam  Vitals and nursing note reviewed.   Constitutional:       General: She is not in acute distress.     Appearance: She is well-developed and normal weight. She is  ill-appearing. She is not diaphoretic.   HENT:      Head: Normocephalic and atraumatic.      Nose: Nose normal.      Mouth/Throat:      Pharynx: No oropharyngeal exudate.   Eyes:      General: No scleral icterus.        Right eye: No discharge.         Left eye: No discharge.      Conjunctiva/sclera: Conjunctivae normal.   Neck:      Thyroid: No thyromegaly.      Vascular: No JVD.      Trachea: No tracheal deviation.   Cardiovascular:      Rate and Rhythm: Normal rate. Rhythm irregular.      Heart sounds: Normal heart sounds.     No friction rub. No gallop.      Comments: Pacemaker pocket nonerythematous  Pulmonary:      Effort: Pulmonary effort is normal. No respiratory distress.      Breath sounds: Normal breath sounds. No stridor. No wheezing or rales.   Chest:      Chest wall: No tenderness.   Abdominal:      General: Bowel sounds are normal. There is no distension.      Palpations: Abdomen is soft. There is no mass.      Tenderness: There is no abdominal tenderness. There is no guarding or rebound.   Genitourinary:     Comments: Newby with dark cloudy urine  Musculoskeletal:         General: No tenderness. Normal range of motion.      Cervical back: Normal range of motion and neck supple.   Lymphadenopathy:      Cervical: No cervical adenopathy.   Skin:     General: Skin is warm and dry.      Findings: No erythema or rash.   Neurological:      General: No focal deficit present.      Mental Status: She is alert and oriented to person, place, and time.      Cranial Nerves: No cranial nerve deficit.      Motor: No abnormal muscle tone.      Coordination: Coordination normal.   Psychiatric:         Mood and Affect: Mood normal.         Behavior: Behavior normal.         Thought Content: Thought content normal.         Judgment: Judgment normal.       Fluids    Intake/Output Summary (Last 24 hours) at 8/28/2022 1207  Last data filed at 8/28/2022 0800  Gross per 24 hour   Intake 600 ml   Output --   Net 600 ml        Laboratory  Recent Labs     08/26/22 1910 08/27/22  0942 08/28/22  0058   WBC 13.1* 18.1* 13.3*   RBC 4.64 4.80 4.30   HEMOGLOBIN 13.9 14.3 12.9   HEMATOCRIT 39.8 42.3 37.7   MCV 85.8 88.1 87.7   MCH 30.0 29.8 30.0   MCHC 34.9 33.8 34.2   RDW 39.8 41.4 41.8   PLATELETCT 362 377 345   MPV 9.3 9.0 9.8     Recent Labs     08/26/22 1910 08/27/22  0130 08/28/22  0058   SODIUM 139 141 138   POTASSIUM 3.2* 3.5* 3.4*   CHLORIDE 108 108 105   CO2 18* 19* 24   GLUCOSE 112* 52* 229*   BUN 29* 27* 18   CREATININE 1.46* 1.29 1.10   CALCIUM 10.1 9.9 10.2     Recent Labs     08/26/22 1910   APTT 26.4   INR 1.00               Imaging  EC-ECHOCARDIOGRAM COMPLETE W/O CONT   Final Result      DX-CHEST-LIMITED (1 VIEW)   Final Result         No acute cardiac or pulmonary abnormality is identified.      CT-FOREIGN FILM CAT SCAN   Final Result      OUTSIDE IMAGES-CT HEAD   Final Result      OUTSIDE IMAGES-DX CHEST   Final Result      [id number] FILM X-RAY   Final Result      MR-BRAIN-W/O    (Results Pending)        Assessment/Plan  * Sepsis associated hypotension (HCC)- (present on admission)  Assessment & Plan  This is Sepsis Present on admission  SIRS criteria identified on my evaluation include: Tachycardia, with heart rate greater than 90 BPM, Leukocytosis, with WBC greater than 12,000 and Bandemia, greater than 10% bands  Source is urinary  Sepsis protocol initiated  Fluid resuscitation ordered per protocol  Crystalloid Fluid Administration: Fluid resuscitation ordered per standard protocol - 30 mL/kg per current or ideal body weight  IV antibiotics as appropriate for source of sepsis  Reassessment: I have reassessed the patient's hemodynamic status  BCs x2  UCx sent          Metabolic encephalopathy- (present on admission)  Assessment & Plan  -CT imaging to include angiography negative at OSH for CVA/LVO/stenosis/aneurysm, will obtain MRI  -vitals and neuro checks q4h  -IVF  -Limit sedatives, attempt to minimize  risk of delirium such as avoiding day time napping and promote night time sleep, frequent reorientation, monitor for constipation, remove lines/tubing not needed, avoid early lab draws and vital checks, limit polypharmacy as able, and keep close to the window  -Aspiration precautions           Hypotension- (present on admission)  Assessment & Plan  While in ED BP trended down to the 60's. Responded to IVF bolus.  Given the hypotension and encephalopathy with somnolence   +UA, urine culture,  blood cultures, cortisol, started ceftriaxone for sepsis.  BP immediately improved with IVF bolus, mentation improved.   Cortisol checked and was low, 12. I will provide one dose of Solu-Cortef 100 mg IV but I suspect her hypotension was more from dehydration.  If any further episodes of hypotension continues with stress dose steroids.    Acute cystitis- (present on admission)  Assessment & Plan  IV ceftriaxone, follow-up urine and blood cultures.  Appears sepsis with urinary source causing hypotension, syncope.    Elevated troponin- (present on admission)  Assessment & Plan  Mildly elevated 22, no ongoing chest pain, had mild chest pain on arrival since resolved.  EKG shows A-V dual-paced rhythm with some inhibition  Will trend    Hypokalemia- (present on admission)  Assessment & Plan  Replacement ordered.     MARCE (acute kidney injury) (HCC)- (present on admission)  Assessment & Plan  UTI + Pre-renal etiology with syncope and hypotension.    Rule out post obstruction  IVF  Renal dose meds and avoid nephrotoxins  Monitor I&O's  Follow renal function      TIA (transient ischemic attack)- (present on admission)  Assessment & Plan  Patient had symptoms speech difficulties reported facial droop concern for stroke at OSH CT neuroimaging to include angiography was negative for acute CVA/LVO/aneurysm/significant stenosis.  MRI, however pacemaker is not compatible with MRI, will need [employer] rep to shut off device for MRI and  turn on post MRI.  Monitor on telemetry  Continue statin    Syncope- (present on admission)  Assessment & Plan  Hypotensive in ER 2/2 sepsis-urinary source.  Monitor on telemetry, check orthostatic vitals, TTE.  IV fluid    Paroxysmal atrial flutter (HCC)- (present on admission)  Assessment & Plan  Patient states she cannot remember the last time she took her medications, in regards to cardiac meds on her MAR metoprolol, diltiazem, digoxin, flecainide, Xarelto.  Cardiology consulted in ED and following, can discuss with them in the morning in regards to an appropriate regimen.  Check dig level.  Monitor on telemetry    Mixed hyperlipidemia- (present on admission)  Assessment & Plan  Continue statin    Diabetes mellitus, type II (HCC)- (present on admission)  Assessment & Plan  Patient with a history of type 2 diabetes.  No recent A1c.  At home patient is on glargine 70 units and metformin.  She initially presented hypoglycemic, metformin held and will restart the patient on Lantus but at 25 units and gradually increase.  She has been hyperglycemic.  The patient on regular insulin 6 units 3 times daily AC meals and continue the sliding scale   Diabetic diet    Bipolar affective (HCC)- (present on admission)  Assessment & Plan  Medications on MAR mirtazapine, lithium, risperidone, clonazepam, quetiapine although patient states she does not take any.      Sick sinus syndrome (HCC)- (present on admission)  Assessment & Plan  Status post PPM placement       VTE prophylaxis: heparin ppx    I have performed a physical exam and reviewed and updated ROS and Plan today (8/28/2022). In review of yesterday's note (8/27/2022), there are no changes except as documented above.

## 2022-08-28 NOTE — ASSESSMENT & PLAN NOTE
Patient with a history of type 2 diabetes.  A1c 8.5.  Daughter reports that the patient is poorly controlled at home she is frequently hyperglycemic and hypoglycemic.  At home patient is on glargine 70 units and metformin.  She initially presented hypoglycemic, metformin held and will restart the patient on Lantus but at 25 units and gradually increase.  She has been hyperglycemic.  The patient on regular insulin 6 units 3 times daily AC meals and continue the sliding scale   Diabetic diet

## 2022-08-28 NOTE — CARE PLAN
The patient is Stable - Low risk of patient condition declining or worsening    Shift Goals  Clinical Goals: Q4 neuro checks, possible MRI, echo, PT/OT eval  Patient Goals: safety, rest  Family Goals: N/A    Progress made toward(s) clinical / shift goals:  Pt is alert and oriented to self and place.  Her situation and time are confused at times.  Vitals WNL, Pt denies pain.  Ambulated to bathroom with walker without complications to void.  Bedtime - treated per MAR.  No acute events at this time.  Callbell within reach and education performed.     Patient is not progressing towards the following goals:      Problem: Knowledge Deficit - Stroke Education  Goal: Patient's knowledge of stroke and risk factors will improve  Outcome: Not Progressing     Problem: Psychosocial - Patient Condition  Goal: Patient's ability to re-evaluate and adapt role responsibilities will improve  Outcome: Not Progressing         Problem: Pain - Standard  Goal: Alleviation of pain or a reduction in pain to the patient’s comfort goal  Outcome: Progressing     Problem: Optimal Care of the Stroke Patient  Goal: Optimal emergency care for the stroke patient  Outcome: Progressing  Goal: Optimal acute care for the stroke patient  Outcome: Progressing     Problem: Neuro Status  Goal: Neuro status will remain stable or improve  Outcome: Progressing     Problem: Hemodynamic Monitoring  Goal: Patient's hemodynamics, fluid balance and neurologic status will be stable or improve  Outcome: Progressing     Problem: Respiratory - Stroke Patient  Goal: Patient will achieve/maintain optimum respiratory rate/effort  Outcome: Progressing     Problem: Dysphagia  Goal: Dysphagia will improve  Outcome: Progressing     Problem: Risk for Aspiration  Goal: Patient's risk for aspiration will be absent or decrease  Outcome: Progressing

## 2022-08-29 LAB
ANION GAP SERPL CALC-SCNC: 13 MMOL/L (ref 7–16)
BASOPHILS # BLD AUTO: 0.5 % (ref 0–1.8)
BASOPHILS # BLD: 0.05 K/UL (ref 0–0.12)
BUN SERPL-MCNC: 11 MG/DL (ref 8–22)
CALCIUM SERPL-MCNC: 10.1 MG/DL (ref 8.5–10.5)
CHLORIDE SERPL-SCNC: 105 MMOL/L (ref 96–112)
CO2 SERPL-SCNC: 24 MMOL/L (ref 20–33)
CREAT SERPL-MCNC: 0.71 MG/DL (ref 0.5–1.4)
EOSINOPHIL # BLD AUTO: 0.17 K/UL (ref 0–0.51)
EOSINOPHIL NFR BLD: 1.8 % (ref 0–6.9)
ERYTHROCYTE [DISTWIDTH] IN BLOOD BY AUTOMATED COUNT: 41.1 FL (ref 35.9–50)
EST. AVERAGE GLUCOSE BLD GHB EST-MCNC: 197 MG/DL
GFR SERPLBLD CREATININE-BSD FMLA CKD-EPI: 92 ML/MIN/1.73 M 2
GLUCOSE BLD STRIP.AUTO-MCNC: 120 MG/DL (ref 65–99)
GLUCOSE BLD STRIP.AUTO-MCNC: 166 MG/DL (ref 65–99)
GLUCOSE BLD STRIP.AUTO-MCNC: 198 MG/DL (ref 65–99)
GLUCOSE BLD STRIP.AUTO-MCNC: 232 MG/DL (ref 65–99)
GLUCOSE BLD STRIP.AUTO-MCNC: 99 MG/DL (ref 65–99)
GLUCOSE SERPL-MCNC: 113 MG/DL (ref 65–99)
HBA1C MFR BLD: 8.5 % (ref 4–5.6)
HCT VFR BLD AUTO: 37.5 % (ref 37–47)
HGB BLD-MCNC: 12.9 G/DL (ref 12–16)
IMM GRANULOCYTES # BLD AUTO: 0.04 K/UL (ref 0–0.11)
IMM GRANULOCYTES NFR BLD AUTO: 0.4 % (ref 0–0.9)
LYMPHOCYTES # BLD AUTO: 2.58 K/UL (ref 1–4.8)
LYMPHOCYTES NFR BLD: 26.7 % (ref 22–41)
MCH RBC QN AUTO: 30.2 PG (ref 27–33)
MCHC RBC AUTO-ENTMCNC: 34.4 G/DL (ref 33.6–35)
MCV RBC AUTO: 87.8 FL (ref 81.4–97.8)
MONOCYTES # BLD AUTO: 0.78 K/UL (ref 0–0.85)
MONOCYTES NFR BLD AUTO: 8.1 % (ref 0–13.4)
NEUTROPHILS # BLD AUTO: 6.04 K/UL (ref 2–7.15)
NEUTROPHILS NFR BLD: 62.5 % (ref 44–72)
NRBC # BLD AUTO: 0 K/UL
NRBC BLD-RTO: 0 /100 WBC
PLATELET # BLD AUTO: 344 K/UL (ref 164–446)
PMV BLD AUTO: 9.6 FL (ref 9–12.9)
POTASSIUM SERPL-SCNC: 3.3 MMOL/L (ref 3.6–5.5)
RBC # BLD AUTO: 4.27 M/UL (ref 4.2–5.4)
SODIUM SERPL-SCNC: 142 MMOL/L (ref 135–145)
WBC # BLD AUTO: 9.7 K/UL (ref 4.8–10.8)

## 2022-08-29 PROCEDURE — 83036 HEMOGLOBIN GLYCOSYLATED A1C: CPT

## 2022-08-29 PROCEDURE — 700102 HCHG RX REV CODE 250 W/ 637 OVERRIDE(OP): Performed by: STUDENT IN AN ORGANIZED HEALTH CARE EDUCATION/TRAINING PROGRAM

## 2022-08-29 PROCEDURE — 36415 COLL VENOUS BLD VENIPUNCTURE: CPT

## 2022-08-29 PROCEDURE — A9270 NON-COVERED ITEM OR SERVICE: HCPCS | Performed by: STUDENT IN AN ORGANIZED HEALTH CARE EDUCATION/TRAINING PROGRAM

## 2022-08-29 PROCEDURE — 700101 HCHG RX REV CODE 250: Performed by: HOSPITALIST

## 2022-08-29 PROCEDURE — 770020 HCHG ROOM/CARE - TELE (206)

## 2022-08-29 PROCEDURE — A9270 NON-COVERED ITEM OR SERVICE: HCPCS

## 2022-08-29 PROCEDURE — 700111 HCHG RX REV CODE 636 W/ 250 OVERRIDE (IP): Performed by: HOSPITALIST

## 2022-08-29 PROCEDURE — 80048 BASIC METABOLIC PNL TOTAL CA: CPT

## 2022-08-29 PROCEDURE — 99233 SBSQ HOSP IP/OBS HIGH 50: CPT | Performed by: STUDENT IN AN ORGANIZED HEALTH CARE EDUCATION/TRAINING PROGRAM

## 2022-08-29 PROCEDURE — 700102 HCHG RX REV CODE 250 W/ 637 OVERRIDE(OP)

## 2022-08-29 PROCEDURE — 700101 HCHG RX REV CODE 250: Performed by: STUDENT IN AN ORGANIZED HEALTH CARE EDUCATION/TRAINING PROGRAM

## 2022-08-29 PROCEDURE — 82962 GLUCOSE BLOOD TEST: CPT | Mod: 91

## 2022-08-29 PROCEDURE — 85025 COMPLETE CBC W/AUTO DIFF WBC: CPT

## 2022-08-29 RX ORDER — CHOLECALCIFEROL (VITAMIN D3) 125 MCG
5 CAPSULE ORAL NIGHTLY PRN
Status: DISCONTINUED | OUTPATIENT
Start: 2022-08-29 | End: 2022-09-01 | Stop reason: HOSPADM

## 2022-08-29 RX ORDER — LABETALOL HYDROCHLORIDE 5 MG/ML
10 INJECTION, SOLUTION INTRAVENOUS EVERY 6 HOURS PRN
Status: DISCONTINUED | OUTPATIENT
Start: 2022-08-29 | End: 2022-09-01 | Stop reason: HOSPADM

## 2022-08-29 RX ORDER — POTASSIUM CHLORIDE 20 MEQ/1
40 TABLET, EXTENDED RELEASE ORAL ONCE
Status: COMPLETED | OUTPATIENT
Start: 2022-08-29 | End: 2022-08-29

## 2022-08-29 RX ADMIN — CEFTRIAXONE SODIUM 1 G: 10 INJECTION, POWDER, FOR SOLUTION INTRAVENOUS at 05:19

## 2022-08-29 RX ADMIN — HEPARIN SODIUM 5000 UNITS: 5000 INJECTION, SOLUTION INTRAVENOUS; SUBCUTANEOUS at 21:22

## 2022-08-29 RX ADMIN — POTASSIUM CHLORIDE 40 MEQ: 20 TABLET, EXTENDED RELEASE ORAL at 08:15

## 2022-08-29 RX ADMIN — ASPIRIN 81 MG: 81 TABLET, COATED ORAL at 05:19

## 2022-08-29 RX ADMIN — Medication 5 MG: at 21:22

## 2022-08-29 RX ADMIN — ATORVASTATIN CALCIUM 80 MG: 80 TABLET, FILM COATED ORAL at 21:22

## 2022-08-29 RX ADMIN — LABETALOL HYDROCHLORIDE 10 MG: 5 INJECTION, SOLUTION INTRAVENOUS at 22:08

## 2022-08-29 RX ADMIN — HEPARIN SODIUM 5000 UNITS: 5000 INJECTION, SOLUTION INTRAVENOUS; SUBCUTANEOUS at 14:00

## 2022-08-29 RX ADMIN — HEPARIN SODIUM 5000 UNITS: 5000 INJECTION, SOLUTION INTRAVENOUS; SUBCUTANEOUS at 05:19

## 2022-08-29 RX ADMIN — Medication 5 MG: at 01:32

## 2022-08-29 RX ADMIN — INSULIN GLARGINE-YFGN 25 UNITS: 100 INJECTION, SOLUTION SUBCUTANEOUS at 17:00

## 2022-08-29 ASSESSMENT — ENCOUNTER SYMPTOMS
DEPRESSION: 0
NECK PAIN: 0
HEADACHES: 0
FOCAL WEAKNESS: 0
CHILLS: 0
SENSORY CHANGE: 0
WEAKNESS: 0
FEVER: 0
VOMITING: 0
MEMORY LOSS: 1
ABDOMINAL PAIN: 0
COUGH: 0
SPEECH CHANGE: 0
DIAPHORESIS: 0
CONSTIPATION: 0
CLAUDICATION: 0
NAUSEA: 0
FALLS: 1
MYALGIAS: 1
DIZZINESS: 0
WHEEZING: 0
PALPITATIONS: 0
SPUTUM PRODUCTION: 0
DIARRHEA: 0
BACK PAIN: 0
SHORTNESS OF BREATH: 0
HEMOPTYSIS: 0

## 2022-08-29 ASSESSMENT — LIFESTYLE VARIABLES: SUBSTANCE_ABUSE: 0

## 2022-08-29 ASSESSMENT — FIBROSIS 4 INDEX: FIB4 SCORE: 1

## 2022-08-29 NOTE — PROGRESS NOTES
Hospital Medicine Daily Progress Note    Date of Service  8/29/2022    Chief Complaint  Feli Vuong is a 66 y.o. female admitted 8/26/2022 with urinary source sepsis with hypotension.    Hospital Course  Feli Vuong is a 66 y.o. female with extensive medical history to include hypertension, hyperlipidemia, paroxysmal A. Flutter, diabetes mellitus, hypertension, hyperlipidemia, bipolar affective disorder, sick sinus syndrome status post pacemaker placement who presented 8/26/2022 as a transfer from OSH for STEMI (cardiology determined no STEMI).     Patient presented to [facility] after being found on the ground syncope versus ground-level fall (story is unclear) with complaint of right hip pain.  Reports that she was having slurred speech and possible facial droop and there was concerns for stroke.  On chart review she was not reporting any chest pain on arrival to OSH.  EKG had concerns of STEMI, she was given 1 L normal saline for soft blood sugars and full dose aspirin, troponin noted to be 0.04 Pro-Jj 0.15 elevated BUN/serum creatinine.  Patient was sent to [facility] for further evaluation.  At their facility she underwent CT head without negative for acute intracranial abnormality, CTA head and neck negative for CVA, LVO, aneurysm.  Right hip x-ray negative for acute fracture or dislocation.  EKG AV dual paced rhythm, no STEMI.   Patient hypotensive and UA +, diagnosed with sepsis.  BCs, UCx sent, on Rocephin IV.  Responding to IVFs.  MARCE improving and BP improved.  Patient c/o dysuria at home, lives alone.  Given solumedrol for acute stress related adrenal crises.    Interval Problem Update  8/27:  continue IVFs and antibiotics.  Cr improving 1.46 to 1.29.  wbc 13.  Patient meets inpatient criteria for severe sepsis.  Patient awakens to tell me that she has had bladder pain for the past week.  Burning with urination.  She lives alone.  It is unclear why she did not seek medical attention  sooner.  I do not detect any gross neurological deficit on exam.  8/28: Patient reports still feeling confused but better than yesterday.  No further episodes of hypoglycemia but now she has hyperglycemia.  We will increase her insulin.  MRI pending and awaiting pacemaker rep.   8/29: Patient still appears confused.  I spoke with the patient's daughter on the phone.  The patient's daughter lives in [place] and the grandson lives across the street from the patient but is frequently in [facility].  The daughter reports that the patient has been having confusion since February 2022 when she was diagnosed with COVID.  She reports that the patient appears to frequently forget her medications.  She reports that the patient has had multiple episodes where her glucose was above 500 and other episodes where it was less than 100.  Patient has also had multiple falls in the last few months.    I have discussed this patient's plan of care and discharge plan at IDT rounds today with Case Management, Nursing, Nursing leadership, and other members of the IDT team.    Consultants/Specialty  cardiology    Code Status  Full Code    Disposition  Patient is not medically cleared for discharge.   Anticipate discharge to to home with close outpatient follow-up.  I have placed the appropriate orders for post-discharge needs.    Review of Systems  Review of Systems   Constitutional:  Positive for malaise/fatigue. Negative for chills, diaphoresis and fever.   Respiratory:  Negative for cough, hemoptysis, sputum production, shortness of breath and wheezing.    Cardiovascular:  Negative for chest pain, palpitations, claudication and leg swelling.   Gastrointestinal:  Negative for abdominal pain, constipation, diarrhea, melena, nausea and vomiting.   Genitourinary:  Negative for dysuria, frequency and urgency.   Musculoskeletal:  Positive for falls and myalgias. Negative for back pain, joint pain and neck pain.   Neurological:  Negative for  dizziness, sensory change, speech change, focal weakness, weakness and headaches.   Psychiatric/Behavioral:  Positive for memory loss. Negative for depression, substance abuse and suicidal ideas.    All other systems reviewed and are negative.     Physical Exam  Temp:  [36.1 °C (96.9 °F)-37.1 °C (98.8 °F)] 36.1 °C (96.9 °F)  Pulse:  [71-92] 92  Resp:  [16-18] 18  BP: (157-187)/() 187/106  SpO2:  [93 %-95 %] 95 %    Physical Exam  Vitals and nursing note reviewed.   Constitutional:       General: She is not in acute distress.     Appearance: She is well-developed and normal weight. She is ill-appearing. She is not diaphoretic.   HENT:      Head: Normocephalic and atraumatic.      Mouth/Throat:      Pharynx: No oropharyngeal exudate.   Eyes:      General: No scleral icterus.        Right eye: No discharge.         Left eye: No discharge.   Neck:      Thyroid: No thyromegaly.      Vascular: No JVD.      Trachea: No tracheal deviation.   Cardiovascular:      Rate and Rhythm: Normal rate. Rhythm irregular.      Heart sounds: Normal heart sounds.     No friction rub. No gallop.      Comments: Pacemaker pocket nonerythematous  Pulmonary:      Effort: Pulmonary effort is normal. No respiratory distress.      Breath sounds: Normal breath sounds. No stridor. No wheezing or rales.   Chest:      Chest wall: No tenderness.   Abdominal:      General: Bowel sounds are normal. There is no distension.      Palpations: Abdomen is soft. There is no mass.      Tenderness: There is no abdominal tenderness. There is no guarding or rebound.   Genitourinary:     Comments: Newby with dark cloudy urine  Musculoskeletal:         General: No tenderness. Normal range of motion.      Cervical back: Normal range of motion and neck supple.   Lymphadenopathy:      Cervical: No cervical adenopathy.   Skin:     General: Skin is warm and dry.      Findings: No erythema or rash.   Neurological:      General: No focal deficit present.      Mental  Status: She is alert and oriented to person, place, and time.      Cranial Nerves: No cranial nerve deficit.      Motor: No abnormal muscle tone.      Coordination: Coordination normal.   Psychiatric:         Mood and Affect: Affect is labile.         Behavior: Behavior normal.         Thought Content: Thought content normal.         Judgment: Judgment normal.       Fluids    Intake/Output Summary (Last 24 hours) at 8/29/2022 1204  Last data filed at 8/29/2022 1023  Gross per 24 hour   Intake 390 ml   Output --   Net 390 ml       Laboratory  Recent Labs     08/28/22  0058 08/28/22  1453 08/29/22  0142   WBC 13.3* 10.7 9.7   RBC 4.30 4.69 4.27   HEMOGLOBIN 12.9 13.9 12.9   HEMATOCRIT 37.7 41.1 37.5   MCV 87.7 87.6 87.8   MCH 30.0 29.6 30.2   MCHC 34.2 33.8 34.4   RDW 41.8 41.0 41.1   PLATELETCT 345 353 344   MPV 9.8 9.3 9.6     Recent Labs     08/27/22  0130 08/28/22  0058 08/29/22  0142   SODIUM 141 138 142   POTASSIUM 3.5* 3.4* 3.3*   CHLORIDE 108 105 105   CO2 19* 24 24   GLUCOSE 52* 229* 113*   BUN 27* 18 11   CREATININE 1.29 1.10 0.71   CALCIUM 9.9 10.2 10.1     Recent Labs     08/26/22  1910   APTT 26.4   INR 1.00               Imaging  EC-ECHOCARDIOGRAM COMPLETE W/O CONT   Final Result      DX-CHEST-LIMITED (1 VIEW)   Final Result         No acute cardiac or pulmonary abnormality is identified.      CT-FOREIGN FILM CAT SCAN   Final Result      OUTSIDE IMAGES-CT HEAD   Final Result      OUTSIDE IMAGES-DX CHEST   Final Result      JI-GHXKHEF-BVSFAGN FILM X-RAY   Final Result      MR-BRAIN-W/O    (Results Pending)        Assessment/Plan  * Sepsis associated hypotension (HCC)- (present on admission)  Assessment & Plan  This is Sepsis Present on admission  SIRS criteria identified on my evaluation include: Tachycardia, with heart rate greater than 90 BPM, Leukocytosis, with WBC greater than 12,000 and Bandemia, greater than 10% bands  Source is urinary  Sepsis protocol initiated  Fluid resuscitation ordered per  protocol  Crystalloid Fluid Administration: Fluid resuscitation ordered per standard protocol - 30 mL/kg per current or ideal body weight  IV antibiotics as appropriate for source of sepsis  Reassessment: I have reassessed the patient's hemodynamic status  BCs x2  UCx sent          Metabolic encephalopathy- (present on admission)  Assessment & Plan  -CT imaging to include angiography negative at OSH for CVA/LVO/stenosis/aneurysm, will obtain MRI  -vitals and neuro checks q4h  -IVF  -Limit sedatives, attempt to minimize risk of delirium such as avoiding day time napping and promote night time sleep, frequent reorientation, monitor for constipation, remove lines/tubing not needed, avoid early lab draws and vital checks, limit polypharmacy as able, and keep close to the window  -Aspiration precautions  HIV, TSH, RPR ordered           Hypotension- (present on admission)  Assessment & Plan  While in ED BP trended down to the 60's. Responded to IVF bolus.  Given the hypotension and encephalopathy with somnolence   +UA, urine culture,  blood cultures, cortisol, started ceftriaxone for sepsis.  BP immediately improved with IVF bolus, mentation improved.   Cortisol checked and was low, 12. I will provide one dose of Solu-Cortef 100 mg IV but I suspect her hypotension was more from dehydration.  If any further episodes of hypotension continues with stress dose steroids.    Acute cystitis- (present on admission)  Assessment & Plan  IV ceftriaxone, follow-up urine and blood cultures.  Appears sepsis with urinary source causing hypotension, syncope.    Elevated troponin- (present on admission)  Assessment & Plan  Mildly elevated 22, no ongoing chest pain, had mild chest pain on arrival since resolved.  EKG shows A-V dual-paced rhythm with some inhibition  Will trend    Hypokalemia- (present on admission)  Assessment & Plan  K 3.3 KCL ordered    MARCE (acute kidney injury) (HCC)- (present on admission)  Assessment & Plan  UTI +  Pre-renal etiology with syncope and hypotension.    Rule out post obstruction  IVF  Renal dose meds and avoid nephrotoxins  Monitor I&O's  Resolved      TIA (transient ischemic attack)- (present on admission)  Assessment & Plan  Patient had symptoms speech difficulties reported facial droop concern for stroke at OSH CT neuroimaging to include angiography was negative for acute CVA/LVO/aneurysm/significant stenosis.  MRI, however pacemaker is not compatible with MRI, will need [employer] rep to shut off device for MRI and turn on post MRI.  Monitor on telemetry  Continue statin    Syncope- (present on admission)  Assessment & Plan  Hypotensive in ER 2/2 sepsis-urinary source.  Monitor on telemetry, check orthostatic vitals, TTE.  IV fluid    Paroxysmal atrial flutter (HCC)- (present on admission)  Assessment & Plan  Patient states she cannot remember the last time she took her medications, in regards to cardiac meds on her MAR metoprolol, diltiazem, digoxin, flecainide, Xarelto.  Cardiology consulted in ED and following, can discuss with them in the morning in regards to an appropriate regimen.  Check dig level.  Monitor on telemetry    Mixed hyperlipidemia- (present on admission)  Assessment & Plan  Continue statin    Diabetes mellitus, type II (HCC)- (present on admission)  Assessment & Plan  Patient with a history of type 2 diabetes.  A1c 8.5.  Daughter reports that the patient is poorly controlled at home she is frequently hyperglycemic and hypoglycemic.  At home patient is on glargine 70 units and metformin.  She initially presented hypoglycemic, metformin held and will restart the patient on Lantus but at 25 units and gradually increase.  She has been hyperglycemic.  The patient on regular insulin 6 units 3 times daily AC meals and continue the sliding scale   Diabetic diet    Bipolar affective (HCC)- (present on admission)  Assessment & Plan  Medications on MAR mirtazapine, lithium, risperidone,  clonazepam, quetiapine although patient states she does not take any.      Sick sinus syndrome (HCC)- (present on admission)  Assessment & Plan  Status post PPM placement       VTE prophylaxis: heparin ppx    I have performed a physical exam and reviewed and updated ROS and Plan today (8/29/2022). In review of yesterday's note (8/28/2022), there are no changes except as documented above.

## 2022-08-29 NOTE — PROGRESS NOTES
Received bedside report from RN, pt care assumed, VSS, pt assessment complete. Pt AAOx2, disoriented to situation and time, with no c/o of pain at this time. No signs of acute distress noted at this time. Plan of care discussed with pt and verbalizes no questions. Pt denies any additional needs at this time. Bed locked/in lowest position, bed alarm on, pt educated on fall risk and verbalized understanding, call light within reach, hourly rounding initiated.

## 2022-08-30 ENCOUNTER — APPOINTMENT (OUTPATIENT)
Dept: RADIOLOGY | Facility: MEDICAL CENTER | Age: 67
DRG: 871 | End: 2022-08-30
Attending: STUDENT IN AN ORGANIZED HEALTH CARE EDUCATION/TRAINING PROGRAM
Payer: MEDICARE

## 2022-08-30 LAB
ALBUMIN SERPL BCP-MCNC: 3.9 G/DL (ref 3.2–4.9)
ALBUMIN/GLOB SERPL: 1.1 G/DL
ALP SERPL-CCNC: 90 U/L (ref 30–99)
ALT SERPL-CCNC: 15 U/L (ref 2–50)
ANION GAP SERPL CALC-SCNC: 15 MMOL/L (ref 7–16)
ANION GAP SERPL CALC-SCNC: 16 MMOL/L (ref 7–16)
APTT PPP: 30.1 SEC (ref 24.7–36)
AST SERPL-CCNC: 22 U/L (ref 12–45)
BACTERIA UR CULT: ABNORMAL
BACTERIA UR CULT: ABNORMAL
BASE EXCESS BLDA CALC-SCNC: -2 MMOL/L (ref -4–3)
BASOPHILS # BLD AUTO: 0.4 % (ref 0–1.8)
BASOPHILS # BLD: 0.05 K/UL (ref 0–0.12)
BILIRUB SERPL-MCNC: 0.4 MG/DL (ref 0.1–1.5)
BODY TEMPERATURE: 38.4 CENTIGRADE
BUN SERPL-MCNC: 10 MG/DL (ref 8–22)
BUN SERPL-MCNC: 13 MG/DL (ref 8–22)
CALCIUM SERPL-MCNC: 10.3 MG/DL (ref 8.5–10.5)
CALCIUM SERPL-MCNC: 10.5 MG/DL (ref 8.5–10.5)
CHLORIDE SERPL-SCNC: 102 MMOL/L (ref 96–112)
CHLORIDE SERPL-SCNC: 98 MMOL/L (ref 96–112)
CK SERPL-CCNC: 136 U/L (ref 0–154)
CO2 SERPL-SCNC: 22 MMOL/L (ref 20–33)
CO2 SERPL-SCNC: 23 MMOL/L (ref 20–33)
CREAT SERPL-MCNC: 0.79 MG/DL (ref 0.5–1.4)
CREAT SERPL-MCNC: 0.87 MG/DL (ref 0.5–1.4)
EOSINOPHIL # BLD AUTO: 0.18 K/UL (ref 0–0.51)
EOSINOPHIL NFR BLD: 1.5 % (ref 0–6.9)
ERYTHROCYTE [DISTWIDTH] IN BLOOD BY AUTOMATED COUNT: 39.4 FL (ref 35.9–50)
ERYTHROCYTE [DISTWIDTH] IN BLOOD BY AUTOMATED COUNT: 39.5 FL (ref 35.9–50)
ERYTHROCYTE [SEDIMENTATION RATE] IN BLOOD BY WESTERGREN METHOD: 15 MM/HOUR (ref 0–25)
FLUAV RNA SPEC QL NAA+PROBE: NEGATIVE
FLUBV RNA SPEC QL NAA+PROBE: NEGATIVE
GFR SERPLBLD CREATININE-BSD FMLA CKD-EPI: 72 ML/MIN/1.73 M 2
GFR SERPLBLD CREATININE-BSD FMLA CKD-EPI: 81 ML/MIN/1.73 M 2
GLOBULIN SER CALC-MCNC: 3.5 G/DL (ref 1.9–3.5)
GLUCOSE BLD STRIP.AUTO-MCNC: 115 MG/DL (ref 65–99)
GLUCOSE BLD STRIP.AUTO-MCNC: 164 MG/DL (ref 65–99)
GLUCOSE BLD STRIP.AUTO-MCNC: 180 MG/DL (ref 65–99)
GLUCOSE BLD STRIP.AUTO-MCNC: 182 MG/DL (ref 65–99)
GLUCOSE BLD STRIP.AUTO-MCNC: 250 MG/DL (ref 65–99)
GLUCOSE SERPL-MCNC: 109 MG/DL (ref 65–99)
GLUCOSE SERPL-MCNC: 187 MG/DL (ref 65–99)
HCO3 BLDA-SCNC: 21 MMOL/L (ref 17–25)
HCT VFR BLD AUTO: 42.3 % (ref 37–47)
HCT VFR BLD AUTO: 44.3 % (ref 37–47)
HGB BLD-MCNC: 14.8 G/DL (ref 12–16)
HGB BLD-MCNC: 15.2 G/DL (ref 12–16)
HIV 1+2 AB+HIV1 P24 AG SERPL QL IA: NORMAL
IMM GRANULOCYTES # BLD AUTO: 0.05 K/UL (ref 0–0.11)
IMM GRANULOCYTES NFR BLD AUTO: 0.4 % (ref 0–0.9)
INR PPP: 0.97 (ref 0.87–1.13)
LACTATE SERPL-SCNC: 2.6 MMOL/L (ref 0.5–2)
LYMPHOCYTES # BLD AUTO: 2.97 K/UL (ref 1–4.8)
LYMPHOCYTES NFR BLD: 24.1 % (ref 22–41)
MCH RBC QN AUTO: 29.6 PG (ref 27–33)
MCH RBC QN AUTO: 30 PG (ref 27–33)
MCHC RBC AUTO-ENTMCNC: 34.3 G/DL (ref 33.6–35)
MCHC RBC AUTO-ENTMCNC: 35 G/DL (ref 33.6–35)
MCV RBC AUTO: 85.8 FL (ref 81.4–97.8)
MCV RBC AUTO: 86.2 FL (ref 81.4–97.8)
MONOCYTES # BLD AUTO: 1.08 K/UL (ref 0–0.85)
MONOCYTES NFR BLD AUTO: 8.8 % (ref 0–13.4)
NEUTROPHILS # BLD AUTO: 7.99 K/UL (ref 2–7.15)
NEUTROPHILS NFR BLD: 64.8 % (ref 44–72)
NRBC # BLD AUTO: 0 K/UL
NRBC BLD-RTO: 0 /100 WBC
PCO2 BLDA: 31.1 MMHG (ref 26–37)
PCO2 TEMP ADJ BLDA: 33.1 MMHG (ref 26–37)
PH BLDA: 7.45 [PH] (ref 7.4–7.5)
PH TEMP ADJ BLDA: 7.43 [PH] (ref 7.4–7.5)
PLATELET # BLD AUTO: 436 K/UL (ref 164–446)
PLATELET # BLD AUTO: 439 K/UL (ref 164–446)
PMV BLD AUTO: 9.5 FL (ref 9–12.9)
PMV BLD AUTO: 9.8 FL (ref 9–12.9)
PO2 BLDA: 75.6 MMHG (ref 64–87)
PO2 TEMP ADJ BLDA: 83 MMHG (ref 64–87)
POTASSIUM SERPL-SCNC: 3.2 MMOL/L (ref 3.6–5.5)
POTASSIUM SERPL-SCNC: 3.9 MMOL/L (ref 3.6–5.5)
PROT SERPL-MCNC: 7.4 G/DL (ref 6–8.2)
PROTHROMBIN TIME: 12.8 SEC (ref 12–14.6)
RBC # BLD AUTO: 4.93 M/UL (ref 4.2–5.4)
RBC # BLD AUTO: 5.14 M/UL (ref 4.2–5.4)
RSV RNA SPEC QL NAA+PROBE: NEGATIVE
SAO2 % BLDA: 95.1 % (ref 93–99)
SARS-COV-2 RNA RESP QL NAA+PROBE: NOTDETECTED
SIGNIFICANT IND 70042: ABNORMAL
SITE SITE: ABNORMAL
SODIUM SERPL-SCNC: 136 MMOL/L (ref 135–145)
SODIUM SERPL-SCNC: 140 MMOL/L (ref 135–145)
SOURCE SOURCE: ABNORMAL
SPECIMEN SOURCE: NORMAL
T PALLIDUM AB SER QL IA: NORMAL
TSH SERPL DL<=0.005 MIU/L-ACNC: 1.06 UIU/ML (ref 0.38–5.33)
WBC # BLD AUTO: 12.3 K/UL (ref 4.8–10.8)
WBC # BLD AUTO: 13.3 K/UL (ref 4.8–10.8)

## 2022-08-30 PROCEDURE — 70450 CT HEAD/BRAIN W/O DYE: CPT

## 2022-08-30 PROCEDURE — 0042T CT-CEREBRAL PERFUSION ANALYSIS: CPT

## 2022-08-30 PROCEDURE — 700105 HCHG RX REV CODE 258: Performed by: STUDENT IN AN ORGANIZED HEALTH CARE EDUCATION/TRAINING PROGRAM

## 2022-08-30 PROCEDURE — 700117 HCHG RX CONTRAST REV CODE 255: Performed by: STUDENT IN AN ORGANIZED HEALTH CARE EDUCATION/TRAINING PROGRAM

## 2022-08-30 PROCEDURE — 71045 X-RAY EXAM CHEST 1 VIEW: CPT

## 2022-08-30 PROCEDURE — A9270 NON-COVERED ITEM OR SERVICE: HCPCS | Performed by: STUDENT IN AN ORGANIZED HEALTH CARE EDUCATION/TRAINING PROGRAM

## 2022-08-30 PROCEDURE — 700102 HCHG RX REV CODE 250 W/ 637 OVERRIDE(OP): Performed by: STUDENT IN AN ORGANIZED HEALTH CARE EDUCATION/TRAINING PROGRAM

## 2022-08-30 PROCEDURE — 80048 BASIC METABOLIC PNL TOTAL CA: CPT

## 2022-08-30 PROCEDURE — 0241U HCHG SARS-COV-2 COVID-19 NFCT DS RESP RNA 4 TRGT MIC: CPT

## 2022-08-30 PROCEDURE — 700111 HCHG RX REV CODE 636 W/ 250 OVERRIDE (IP): Performed by: HOSPITALIST

## 2022-08-30 PROCEDURE — 99223 1ST HOSP IP/OBS HIGH 75: CPT | Performed by: NURSE PRACTITIONER

## 2022-08-30 PROCEDURE — 770020 HCHG ROOM/CARE - TELE (206)

## 2022-08-30 PROCEDURE — 85652 RBC SED RATE AUTOMATED: CPT

## 2022-08-30 PROCEDURE — 70496 CT ANGIOGRAPHY HEAD: CPT

## 2022-08-30 PROCEDURE — 700101 HCHG RX REV CODE 250: Performed by: HOSPITALIST

## 2022-08-30 PROCEDURE — 70498 CT ANGIOGRAPHY NECK: CPT

## 2022-08-30 PROCEDURE — 83605 ASSAY OF LACTIC ACID: CPT

## 2022-08-30 PROCEDURE — 700101 HCHG RX REV CODE 250: Performed by: STUDENT IN AN ORGANIZED HEALTH CARE EDUCATION/TRAINING PROGRAM

## 2022-08-30 PROCEDURE — 84443 ASSAY THYROID STIM HORMONE: CPT

## 2022-08-30 PROCEDURE — 85610 PROTHROMBIN TIME: CPT

## 2022-08-30 PROCEDURE — 80053 COMPREHEN METABOLIC PANEL: CPT

## 2022-08-30 PROCEDURE — 51798 US URINE CAPACITY MEASURE: CPT

## 2022-08-30 PROCEDURE — 36415 COLL VENOUS BLD VENIPUNCTURE: CPT

## 2022-08-30 PROCEDURE — 82803 BLOOD GASES ANY COMBINATION: CPT

## 2022-08-30 PROCEDURE — 86780 TREPONEMA PALLIDUM: CPT

## 2022-08-30 PROCEDURE — 82962 GLUCOSE BLOOD TEST: CPT | Mod: 91

## 2022-08-30 PROCEDURE — 700111 HCHG RX REV CODE 636 W/ 250 OVERRIDE (IP): Performed by: STUDENT IN AN ORGANIZED HEALTH CARE EDUCATION/TRAINING PROGRAM

## 2022-08-30 PROCEDURE — G0475 HIV COMBINATION ASSAY: HCPCS

## 2022-08-30 PROCEDURE — 85027 COMPLETE CBC AUTOMATED: CPT

## 2022-08-30 PROCEDURE — C9803 HOPD COVID-19 SPEC COLLECT: HCPCS | Performed by: STUDENT IN AN ORGANIZED HEALTH CARE EDUCATION/TRAINING PROGRAM

## 2022-08-30 PROCEDURE — 85025 COMPLETE CBC W/AUTO DIFF WBC: CPT

## 2022-08-30 PROCEDURE — 85730 THROMBOPLASTIN TIME PARTIAL: CPT

## 2022-08-30 PROCEDURE — 82550 ASSAY OF CK (CPK): CPT

## 2022-08-30 PROCEDURE — 92526 ORAL FUNCTION THERAPY: CPT

## 2022-08-30 PROCEDURE — 99291 CRITICAL CARE FIRST HOUR: CPT | Performed by: STUDENT IN AN ORGANIZED HEALTH CARE EDUCATION/TRAINING PROGRAM

## 2022-08-30 RX ORDER — MIDAZOLAM HYDROCHLORIDE 1 MG/ML
1 INJECTION INTRAMUSCULAR; INTRAVENOUS ONCE
Status: DISCONTINUED | OUTPATIENT
Start: 2022-08-30 | End: 2022-08-30

## 2022-08-30 RX ORDER — METOPROLOL SUCCINATE 50 MG/1
100 TABLET, EXTENDED RELEASE ORAL DAILY
Status: DISCONTINUED | OUTPATIENT
Start: 2022-08-30 | End: 2022-09-01 | Stop reason: HOSPADM

## 2022-08-30 RX ORDER — MIDAZOLAM HYDROCHLORIDE 1 MG/ML
1 INJECTION INTRAMUSCULAR; INTRAVENOUS
Status: COMPLETED | OUTPATIENT
Start: 2022-08-30 | End: 2022-08-30

## 2022-08-30 RX ORDER — GABAPENTIN 300 MG/1
300 CAPSULE ORAL 3 TIMES DAILY
Status: DISCONTINUED | OUTPATIENT
Start: 2022-08-30 | End: 2022-09-01 | Stop reason: HOSPADM

## 2022-08-30 RX ORDER — HALOPERIDOL 5 MG/ML
5 INJECTION INTRAMUSCULAR ONCE
Status: COMPLETED | OUTPATIENT
Start: 2022-08-30 | End: 2022-08-30

## 2022-08-30 RX ORDER — CLONAZEPAM 0.5 MG/1
1 TABLET ORAL 2 TIMES DAILY
Status: DISCONTINUED | OUTPATIENT
Start: 2022-08-30 | End: 2022-09-01 | Stop reason: HOSPADM

## 2022-08-30 RX ORDER — ACETAMINOPHEN 650 MG/1
650 SUPPOSITORY RECTAL EVERY 6 HOURS PRN
Status: DISCONTINUED | OUTPATIENT
Start: 2022-08-30 | End: 2022-09-01 | Stop reason: HOSPADM

## 2022-08-30 RX ORDER — DIAZEPAM 5 MG/ML
5 INJECTION, SOLUTION INTRAMUSCULAR; INTRAVENOUS EVERY 6 HOURS PRN
Status: DISCONTINUED | OUTPATIENT
Start: 2022-08-30 | End: 2022-08-30

## 2022-08-30 RX ORDER — DIPHENHYDRAMINE HYDROCHLORIDE 50 MG/ML
25 INJECTION INTRAMUSCULAR; INTRAVENOUS ONCE
Status: COMPLETED | OUTPATIENT
Start: 2022-08-30 | End: 2022-08-30

## 2022-08-30 RX ORDER — RISPERIDONE 1 MG/1
1 TABLET ORAL
Status: DISCONTINUED | OUTPATIENT
Start: 2022-08-30 | End: 2022-08-30

## 2022-08-30 RX ORDER — DIAZEPAM 5 MG/ML
5 INJECTION, SOLUTION INTRAMUSCULAR; INTRAVENOUS
Status: DISCONTINUED | OUTPATIENT
Start: 2022-08-30 | End: 2022-08-31

## 2022-08-30 RX ADMIN — MIDAZOLAM HYDROCHLORIDE 1 MG: 1 INJECTION, SOLUTION INTRAMUSCULAR; INTRAVENOUS at 13:39

## 2022-08-30 RX ADMIN — HEPARIN SODIUM 5000 UNITS: 5000 INJECTION, SOLUTION INTRAVENOUS; SUBCUTANEOUS at 04:59

## 2022-08-30 RX ADMIN — IOHEXOL 40 ML: 350 INJECTION, SOLUTION INTRAVENOUS at 13:56

## 2022-08-30 RX ADMIN — INSULIN GLARGINE-YFGN 25 UNITS: 100 INJECTION, SOLUTION SUBCUTANEOUS at 18:45

## 2022-08-30 RX ADMIN — HALOPERIDOL LACTATE 5 MG: 5 INJECTION, SOLUTION INTRAMUSCULAR at 14:24

## 2022-08-30 RX ADMIN — LABETALOL HYDROCHLORIDE 10 MG: 5 INJECTION, SOLUTION INTRAVENOUS at 12:34

## 2022-08-30 RX ADMIN — ACETAMINOPHEN 650 MG: 650 SUPPOSITORY RECTAL at 15:47

## 2022-08-30 RX ADMIN — IOHEXOL 100 ML: 350 INJECTION, SOLUTION INTRAVENOUS at 13:41

## 2022-08-30 RX ADMIN — CEFTRIAXONE SODIUM 1 G: 10 INJECTION, POWDER, FOR SOLUTION INTRAVENOUS at 05:02

## 2022-08-30 RX ADMIN — PIPERACILLIN AND TAZOBACTAM 3.38 G: 3; .375 INJECTION, POWDER, LYOPHILIZED, FOR SOLUTION INTRAVENOUS; PARENTERAL at 18:46

## 2022-08-30 RX ADMIN — PIPERACILLIN SODIUM AND TAZOBACTAM SODIUM 3.38 G: 3; .375 INJECTION, POWDER, FOR SOLUTION INTRAVENOUS at 21:34

## 2022-08-30 RX ADMIN — DIPHENHYDRAMINE HYDROCHLORIDE 25 MG: 50 INJECTION, SOLUTION INTRAMUSCULAR; INTRAVENOUS at 14:24

## 2022-08-30 RX ADMIN — ASPIRIN 81 MG: 81 TABLET, COATED ORAL at 04:59

## 2022-08-30 RX ADMIN — ACETAMINOPHEN 650 MG: 325 TABLET ORAL at 00:36

## 2022-08-30 RX ADMIN — DIAZEPAM 5 MG: 5 INJECTION, SOLUTION INTRAMUSCULAR; INTRAVENOUS at 19:02

## 2022-08-30 RX ADMIN — DIAZEPAM 5 MG: 5 INJECTION, SOLUTION INTRAMUSCULAR; INTRAVENOUS at 16:35

## 2022-08-30 ASSESSMENT — ENCOUNTER SYMPTOMS
HEADACHES: 0
NECK PAIN: 0
DEPRESSION: 0
ABDOMINAL PAIN: 0
COUGH: 0
MYALGIAS: 1
BACK PAIN: 0
WHEEZING: 0
SHORTNESS OF BREATH: 0
CONSTIPATION: 0
SENSORY CHANGE: 0
DIAPHORESIS: 0
NAUSEA: 0
CLAUDICATION: 0
DIARRHEA: 0
SPEECH CHANGE: 0
PALPITATIONS: 0
FALLS: 1
FOCAL WEAKNESS: 0
HEMOPTYSIS: 0
SPUTUM PRODUCTION: 0
WEAKNESS: 0
DIZZINESS: 0
MEMORY LOSS: 1
FEVER: 0
VOMITING: 0
CHILLS: 0

## 2022-08-30 ASSESSMENT — FIBROSIS 4 INDEX: FIB4 SCORE: 0.89

## 2022-08-30 ASSESSMENT — CHA2DS2 SCORE
HYPERTENSION: YES
DIABETES: YES
AGE 65 TO 74: NO
CHF OR LEFT VENTRICULAR DYSFUNCTION: NO
VASCULAR DISEASE: NO
CHA2DS2 VASC SCORE: 4
AGE 75 OR GREATER: NO
SEX: MALE
PRIOR STROKE OR TIA OR THROMBOEMBOLISM: YES

## 2022-08-30 ASSESSMENT — LIFESTYLE VARIABLES: SUBSTANCE_ABUSE: 0

## 2022-08-30 NOTE — THERAPY
Physical Therapy Contact Note    PT re consult received and acknowledged. Patient was evaluated by PT 8/28 and patient is mobilizing at or near functional baseline, she performed all mobility at supervision level at time of assessment. Unclear why PT was re consulted. Completing order.    Sweta Youngblood, PT, DPT  [phone]

## 2022-08-30 NOTE — CODE DOCUMENTATION
Lab at bedside, pt very agitated and unable to follow commands. New orders received by Dr. Ratliff.

## 2022-08-30 NOTE — PROGRESS NOTES
Pt was overheard from nurses station speaking incoherent sentences. RN walked into room to assess pt and perform NIHSS. Pt was incoherent and unable to follow commands. Code stroke was immediately called and Dr. Ratliff called bedside. Pt's last known well was 30 minutes prior at 12:15 when pt was AOx4 and sitting up in a chair eating breakfast.

## 2022-08-30 NOTE — CONSULTS
Chief Complaint   Patient presents with    Irregular Heart Beat     Transfer from [facility] fall at home c/c hip pain, EMS with facial droop resolved and -stroke, at hosp abnormal EKG with inf/lateral STEMI and increased Trop, +blood thinners, +pacemaker         Neurology STROKE ALERT Note  Neurohospitalist Service, [facility]    History of present illness:  Feli Vuong 66 y.o. female with a PMHx of HTN, hyperlipidemia, paroxysmal A. Fib, DM II, bipolar affective disorder who presented to [facility] as a STEMI transfer from an OSH on 8/26/2022.Patient experienced a GLF prior to admission and had reports of slurred speech with facial droop. CT head and CTA head/neck at that time were negative for acute findings, LVO, or critical stenosis. Subsequently that patient was determined to be in sepsis with hypotension, a positive UA, and responded well to IVF therapy.    Patient became more altered throughout today, with word salad and failing to follow commands. A Stroke Alert was activated at 1253. Patient was immediately take for CT Head, CTA head/neck, and CT perfusion studies.    Stroke Evaluation requested by Dr. Jian Ratliff.      Past medical history:   Past Medical History:   Diagnosis Date    Atrial flutter (HCC) 7/15/2013    Bipolar affective (HCC) 4/19/2013    Cardiac pacemaker in situ 7/15/2013    Chronic pain 4/19/2013    Diabetes mellitus type II 4/19/2013    Precordial chest pain 7/15/2013    Pulmonary hypertension (HCC) 5/16/2014    Mild on echo of 4/2014     Sick sinus syndrome (HCC) 2/2/2013    Tachy-kamran syndrome (HCC) 2/2/2013       Past surgical history:   Past Surgical History:   Procedure Laterality Date    BLADDER SUSPENSION      HYSTERECTOMY, VAGINAL      PACEMAKER INSERTION         Family history:   Family History   Problem Relation Age of Onset    Heart Disease Mother     Heart Attack Mother        Social history:   Social History     Socioeconomic History    Marital status:  Single     Spouse name: Not on file    Number of children: Not on file    Years of education: Not on file    Highest education level: Not on file   Occupational History    Not on file   Tobacco Use    Smoking status: Never    Smokeless tobacco: Never   Vaping Use    Vaping Use: Never used   Substance and Sexual Activity    Alcohol use: No    Drug use: Never    Sexual activity: Not on file   Other Topics Concern    Not on file   Social History Narrative    Not on file     Social Determinants of Health     Financial Resource Strain: Not on file   Food Insecurity: Not on file   Transportation Needs: Not on file   Physical Activity: Not on file   Stress: Not on file   Social Connections: Not on file   Intimate Partner Violence: Not on file   Housing Stability: Not on file       Current medications:   Current Facility-Administered Medications   Medication Dose    midazolam (Versed) injection 1 mg  1 mg    midazolam (Versed) injection 1 mg  1 mg    melatonin tablet 5 mg  5 mg    labetalol (NORMODYNE/TRANDATE) injection 10 mg  10 mg    insulin GLARGINE (Lantus,Semglee) injection  25 Units    insulin regular (HumuLIN R,NovoLIN R) injection  6 Units    insulin regular (HumuLIN R,NovoLIN R) injection  1-6 Units    And    dextrose 10 % BOLUS 25 g  25 g    cefTRIAXone (Rocephin) syringe 1 g  1 g    atorvastatin (LIPITOR) tablet 80 mg  80 mg    aspirin EC (ECOTRIN) tablet 81 mg  81 mg    heparin injection 5,000 Units  5,000 Units    ipratropium-albuterol (DUONEB) nebulizer solution  3 mL    senna-docusate (PERICOLACE or SENOKOT S) 8.6-50 MG per tablet 2 Tablet  2 Tablet    And    polyethylene glycol/lytes (MIRALAX) PACKET 1 Packet  1 Packet    And    magnesium hydroxide (MILK OF MAGNESIA) suspension 30 mL  30 mL    And    bisacodyl (DULCOLAX) suppository 10 mg  10 mg    Pharmacy Consult Request - to monitor for nephrotoxic agents  1 Each    acetaminophen (Tylenol) tablet 650 mg  650 mg    ondansetron (ZOFRAN) syringe/vial  injection 4 mg  4 mg    ondansetron (ZOFRAN ODT) dispertab 4 mg  4 mg       Medication Allergy:  Allergies   Allergen Reactions    Septra [Bactrim]     Tape     Tegretol [Carbamazepine]     Digoxin Vomiting     When taken with antibiotics         Review of systems:   Unable to obtain ROS due to altered mental status    Physical examination:   Vitals:    08/30/22 1234 08/30/22 1238 08/30/22 1247 08/30/22 1251   BP: (!) 184/78 (!) 184/78  (!) 162/95   Pulse: (!) 104 (!) 103  77   Resp: 18 (!) 22 20    Temp:       TempSrc:       SpO2: 96% 96%     Weight:       Height:         General: Patient resting in bed  HEENT: Normocephalic, no signs of acute trauma.   Neck: supple, no meningeal signs or carotid bruits. There is normal range of motion. No tenderness on exam.   Chest: clear to auscultation. No cough.   CV: RRR, no murmurs.   Skin: no signs of acute rashes or trauma.   Musculoskeletal: joints exhibit full range of motion, without any pain to palpation. There are no signs of joint or muscle swelling. There is no tenderness to deep palpation of muscles.   Psychiatric: No hallucinatory behavior. Denies symptoms of depression or suicidal ideation. Mood and affect appear normal on exam.     NEUROLOGICAL EXAM:   Mental status, orientation: Lethargic, following simple commands requiring repeated stimuli   Speech and language: Dysarthric with word finding difficulties  Memory: Unable to assess   Cranial nerve exam: Pupils are 3 mm bilaterally and equally reactive to light and accommodation. Visual fields are intact by confrontation. There is no nystagmus on primary or secondary gaze. Face appears symmetric. Sensation in the face is intact to light touch. Tongue is midline.  Motor exam: Strength is 5/5 in all extremities. Purposeful movement symmetrical throughout  Sensory exam reveals normal sense of light touch  Deep tendon reflexes:  2+ throughout. Plantar responses are flexor. There is no clonus.   Coordination: Not  assessed  Gait: Not assessed due to acuity    NIH Stroke Scale    1a Level of Consciousness 1  1b Orientation Questions 2  1c Response to Commands 1   2 Gaze 0  3 Visual Fields 0  4 Facial Movement 0  5 Motor Function (arm)   a Left 0  b Right 0  6 Motor Function (leg)   a Left 0  b Right 0  7 Limb Ataxia 0  8 Sensory 0  9 Language 2  10 Articulation 2  11 Extinction/Inattention 0    Score: 8    ANCILLARY DATA REVIEWED:     Lab Data Review:  Recent Results (from the past 24 hour(s))   POCT glucose device results    Collection Time: 08/29/22  4:51 PM   Result Value Ref Range    POC Glucose, Blood 198 (H) 65 - 99 mg/dL   POCT glucose device results    Collection Time: 08/29/22  9:15 PM   Result Value Ref Range    POC Glucose, Blood 99 65 - 99 mg/dL   CBC WITH DIFFERENTIAL    Collection Time: 08/30/22  1:15 AM   Result Value Ref Range    WBC 12.3 (H) 4.8 - 10.8 K/uL    RBC 5.14 4.20 - 5.40 M/uL    Hemoglobin 15.2 12.0 - 16.0 g/dL    Hematocrit 44.3 37.0 - 47.0 %    MCV 86.2 81.4 - 97.8 fL    MCH 29.6 27.0 - 33.0 pg    MCHC 34.3 33.6 - 35.0 g/dL    RDW 39.5 35.9 - 50.0 fL    Platelet Count 439 164 - 446 K/uL    MPV 9.8 9.0 - 12.9 fL    Neutrophils-Polys 64.80 44.00 - 72.00 %    Lymphocytes 24.10 22.00 - 41.00 %    Monocytes 8.80 0.00 - 13.40 %    Eosinophils 1.50 0.00 - 6.90 %    Basophils 0.40 0.00 - 1.80 %    Immature Granulocytes 0.40 0.00 - 0.90 %    Nucleated RBC 0.00 /100 WBC    Neutrophils (Absolute) 7.99 (H) 2.00 - 7.15 K/uL    Lymphs (Absolute) 2.97 1.00 - 4.80 K/uL    Monos (Absolute) 1.08 (H) 0.00 - 0.85 K/uL    Eos (Absolute) 0.18 0.00 - 0.51 K/uL    Baso (Absolute) 0.05 0.00 - 0.12 K/uL    Immature Granulocytes (abs) 0.05 0.00 - 0.11 K/uL    NRBC (Absolute) 0.00 K/uL   Basic Metabolic Panel    Collection Time: 08/30/22  1:15 AM   Result Value Ref Range    Sodium 140 135 - 145 mmol/L    Potassium 3.2 (L) 3.6 - 5.5 mmol/L    Chloride 102 96 - 112 mmol/L    Co2 23 20 - 33 mmol/L    Glucose 109 (H) 65 - 99  mg/dL    Bun 10 8 - 22 mg/dL    Creatinine 0.79 0.50 - 1.40 mg/dL    Calcium 10.5 8.5 - 10.5 mg/dL    Anion Gap 15.0 7.0 - 16.0   TSH    Collection Time: 08/30/22  1:15 AM   Result Value Ref Range    TSH 1.060 0.380 - 5.330 uIU/mL   HIV AG/AB COMBO ASSAY SCREENING    Collection Time: 08/30/22  1:15 AM   Result Value Ref Range    HIV Ag/Ab Combo Assay Non-Reactive Non Reactive   T.PALLIDUM AB EIA    Collection Time: 08/30/22  1:15 AM   Result Value Ref Range    Syphilis, Treponemal Qual Non-Reactive Non-Reactive   Sed Rate    Collection Time: 08/30/22  1:15 AM   Result Value Ref Range    Sed Rate Westergren 15 0 - 25 mm/hour   ESTIMATED GFR    Collection Time: 08/30/22  1:15 AM   Result Value Ref Range    GFR (CKD-EPI) 81 >60 mL/min/1.73 m 2   POCT glucose device results    Collection Time: 08/30/22  1:25 AM   Result Value Ref Range    POC Glucose, Blood 115 (H) 65 - 99 mg/dL   POCT glucose device results    Collection Time: 08/30/22  8:17 AM   Result Value Ref Range    POC Glucose, Blood 164 (H) 65 - 99 mg/dL       Labs reviewed by me.       Imaging reviewed by me:     EC-ECHOCARDIOGRAM COMPLETE W/O CONT   Final Result      DX-CHEST-LIMITED (1 VIEW)   Final Result         No acute cardiac or pulmonary abnormality is identified.      CT-FOREIGN FILM CAT SCAN   Final Result      OUTSIDE IMAGES-CT HEAD   Final Result      OUTSIDE IMAGES-DX CHEST   Final Result      [id number] FILM X-RAY   Final Result      MR-BRAIN-W/O    (Results Pending)   CT-HEAD W/O    (Results Pending)   CT-CEREBRAL PERFUSION ANALYSIS    (Results Pending)   CT-CTA HEAD WITH & W/O-POST PROCESS    (Results Pending)   CT-CTA NECK WITH & W/O-POST PROCESSING    (Results Pending)         Presumed mechanism by TOAST:  __Large Artery Atherosclerosis  X Small Vessel (Lacunar)  __Cardioembolic  __Other (Sickle Cell, Vasculitis, Hypercoagulable)  __Unknown    Modified Stirling Scale (MRS): 3 = Moderate disability; requires some help, but able to walk  without assistance      ASSESSMENT AND PLAN:    66 y.o. female with a PMHx of HTN, hyperlipidemia, paroxysmal A. Fib, DM II, bipolar affective disorder who presented to [facility] as a STEMI transfer from an OSH on 8/26/2022.Patient experienced a GLF prior to admission and had reports of slurred speech with facial droop. CT head and CTA head/neck at that time were negative for acute findings, LVO, or critical stenosis. Subsequently that patient was determined to be in sepsis with hypotension, a positive UA, and responded well to IVF therapy.    Patient became more altered throughout today, with word salad and failing to follow commands. A Stroke Alert was activated at 1253. Patient was immediately take for CT Head, CTA head/neck, and CT perfusion studies. CT Head demonstrates possible small subacute lacunar infarct of the right thalamus that was not seen on previous scan. CTA head/neck negative for LVO or critical stenosis. CT Perfusion negative for CBF mismatch. The right thalamic infarct does not clinically correlate for the patient's current neurological exam. Suspect psychogenic vs. withdrawal precipitating patient's altered mental status.    - Recommend resuming home Xarelto 20mg daily  - Resume home dose of atorvastatin 80mg daily, obtain lipid panel, longterm LDL goal <70  - Normotensive goal, with longterm -140  - Diabetic management per primary team, HgA1C 8.5 on admission. Further optimize glycemic control.  - Resume home benzodiazepines to mitigate possible withdrawal symptoms  - Defer psychiatric medication management to primary team    Patient discussed and plan of care created in conjunction with Dr. Jus Jewell, Neurology. Please contact with any questions.       JOSE Samuel.  [facility]

## 2022-08-30 NOTE — CODE DOCUMENTATION
Change in patient condition due to: Neurologic  Rapid Response called at: 1233  Physician Dr. Ratliff notified at 1235    See Code Blue timeline for rapid response events. Patient Remained on Unit

## 2022-08-30 NOTE — THERAPY
Speech Language Pathology  Daily Treatment     Patient Name: Feli Vuong  Age:  66 y.o., Sex:  female  Medical Record #: [id number]  Today's Date: 8/30/2022     Precautions  Precautions: (P) Fall Risk, Swallow Precautions ( See Comments)    H&P  Pt is a 66 y.o. female who was admitted 8/26/22 from outside hospital with possible STEMI and TIA, found with urinary source sepsis with hypotension. Pt presented to [facility] after being found down with syncope vs GLF. All imaging at OSH was negative.    Subjective  Pt awake and alert upon SLP arrival. Pt concerned about her medications but agreeable and cooperative w/ SLP tx tasks. Pt easily distracted, very verbose.    Assessment  Pt self-administered PO trials to assess readiness for diet upgrade. PO trials of thin liquids vis single straw sips, regular solids, and puree. Pt w/o clinical s/sx of aspiration w/ thin liquids approx. 6oz. Good adherence to small sips. Pt w/o s/sx of dysphagia w/ puree trials, moderate independent adherence to small bites. Pt w/ significantly increased mastication time w/ regular solids, both ximena cracker and cheese stick. Suspect increased mastication due in part to pt high level of distractibility and consistent talking while eating w/ over 50% of bolus still in oral cavity.. Pt w/ moderate oral residue, use of liquid wash x3 to clear.     Clinical Impressions  Pt continues to present w/ oral phase dysphagia, evidenced by residue and prolonged mastication. No s/sx of pharyngeal phase dysphagia observed during tx session. Pt oral phase impairments functional for maintenance of current diet of SB6/TN0. Pt sufficient ability to self-feed and poor attention to task indicate need for intermittent supervision at this time. Pt and RN aware and in agreement.      Recommendations  1.  Soft and bite sized solids and thin liquids   3.  Swallowing Instructions & Precautions:   Supervision: Assist with meal tray set up, self-feeding, CHECK ON PT EVERY  "5 MIN.   Positioning: Fully upright and midline during oral intake  Medication: Whole with liquid, Whole with puree, As tolerated  Strategies: Small bites/sips, Alternate bites and sips, Reduce environmental distractions  Oral Care: Q8h    Plan    Continue current treatment plan.    Discharge Recommendations: (P) Recommend outpatient speech therapy services    Objective   08/30/22 1300   Precautions   Precautions Fall Risk;Swallow Precautions ( See Comments)   Vitals   O2 Delivery Device None - Room Air   Pain 0 - 10 Group   Therapist Pain Assessment Post Activity Pain Same as Prior to Activity;0   Patient / Family Goals   Patient / Family Goal #1 \"I need some water\"   Goal #1 Outcome Progressing as expected   Short Term Goals   Short Term Goal # 1 Pt will consume SB6/TN0 free from s/sx aspiration or respiratory compromise.   Goal Outcome # 1 Progressing as expected   Education Group   Education Provided Dysphagia;Role of Speech Therapy   Dysphagia Patient Response Patient;Acceptance;Explanation;Verbal Demonstration;Reinforcement Needed   Role of SLP Patient Response Patient;Acceptance;Explanation;Verbal Demonstration;Reinforcement Needed   Additional Comments Pt will need reinforcement   Anticipated Discharge Needs   Discharge Recommendations Recommend outpatient speech therapy services   Therapy Recommendations Upon DC Dysphagia Training;Patient / Family / Caregiver Education;Community Re-Integration   Interdisciplinary Plan of Care Collaboration   IDT Collaboration with  Nursing   Patient Position at End of Therapy In Bed;Bed Alarm On;Call Light within Reach;Tray Table within Reach  (All verbalized needs met.)   Collaboration Comments RN aware of recommendations     "

## 2022-08-30 NOTE — DISCHARGE PLANNING
Hospital Care Management- Medical Social Work      LMSW awaiting HH order, as discussed in IDT rounds. [facility] only provider in [place], [place].

## 2022-08-31 PROBLEM — F13.932 BENZODIAZEPINE WITHDRAWAL WITH PERCEPTUAL DISTURBANCE (HCC): Status: ACTIVE | Noted: 2022-08-31

## 2022-08-31 LAB
ALBUMIN SERPL BCP-MCNC: 3.6 G/DL (ref 3.2–4.9)
ALBUMIN/GLOB SERPL: 1.1 G/DL
ALP SERPL-CCNC: 77 U/L (ref 30–99)
ALT SERPL-CCNC: 14 U/L (ref 2–50)
ANION GAP SERPL CALC-SCNC: 14 MMOL/L (ref 7–16)
AST SERPL-CCNC: 22 U/L (ref 12–45)
BASOPHILS # BLD AUTO: 0.5 % (ref 0–1.8)
BASOPHILS # BLD: 0.05 K/UL (ref 0–0.12)
BILIRUB SERPL-MCNC: 0.5 MG/DL (ref 0.1–1.5)
BUN SERPL-MCNC: 16 MG/DL (ref 8–22)
CALCIUM SERPL-MCNC: 10 MG/DL (ref 8.5–10.5)
CHLORIDE SERPL-SCNC: 101 MMOL/L (ref 96–112)
CO2 SERPL-SCNC: 25 MMOL/L (ref 20–33)
CREAT SERPL-MCNC: 1.17 MG/DL (ref 0.5–1.4)
EKG IMPRESSION: NORMAL
EKG IMPRESSION: NORMAL
EOSINOPHIL # BLD AUTO: 0.1 K/UL (ref 0–0.51)
EOSINOPHIL NFR BLD: 0.9 % (ref 0–6.9)
ERYTHROCYTE [DISTWIDTH] IN BLOOD BY AUTOMATED COUNT: 41.1 FL (ref 35.9–50)
GFR SERPLBLD CREATININE-BSD FMLA CKD-EPI: 51 ML/MIN/1.73 M 2
GLOBULIN SER CALC-MCNC: 3.4 G/DL (ref 1.9–3.5)
GLUCOSE BLD STRIP.AUTO-MCNC: 167 MG/DL (ref 65–99)
GLUCOSE BLD STRIP.AUTO-MCNC: 239 MG/DL (ref 65–99)
GLUCOSE SERPL-MCNC: 141 MG/DL (ref 65–99)
HCT VFR BLD AUTO: 43.2 % (ref 37–47)
HGB BLD-MCNC: 14.8 G/DL (ref 12–16)
IMM GRANULOCYTES # BLD AUTO: 0.05 K/UL (ref 0–0.11)
IMM GRANULOCYTES NFR BLD AUTO: 0.5 % (ref 0–0.9)
LACTATE SERPL-SCNC: 1.4 MMOL/L (ref 0.5–2)
LYMPHOCYTES # BLD AUTO: 2.92 K/UL (ref 1–4.8)
LYMPHOCYTES NFR BLD: 26.4 % (ref 22–41)
MCH RBC QN AUTO: 29.8 PG (ref 27–33)
MCHC RBC AUTO-ENTMCNC: 34.3 G/DL (ref 33.6–35)
MCV RBC AUTO: 86.9 FL (ref 81.4–97.8)
MONOCYTES # BLD AUTO: 1.09 K/UL (ref 0–0.85)
MONOCYTES NFR BLD AUTO: 9.9 % (ref 0–13.4)
NEUTROPHILS # BLD AUTO: 6.85 K/UL (ref 2–7.15)
NEUTROPHILS NFR BLD: 61.8 % (ref 44–72)
NRBC # BLD AUTO: 0 K/UL
NRBC BLD-RTO: 0 /100 WBC
PLATELET # BLD AUTO: 402 K/UL (ref 164–446)
PMV BLD AUTO: 9.1 FL (ref 9–12.9)
POTASSIUM SERPL-SCNC: 3.3 MMOL/L (ref 3.6–5.5)
PROT SERPL-MCNC: 7 G/DL (ref 6–8.2)
RBC # BLD AUTO: 4.97 M/UL (ref 4.2–5.4)
SODIUM SERPL-SCNC: 140 MMOL/L (ref 135–145)
TROPONIN T SERPL-MCNC: 34 NG/L (ref 6–19)
WBC # BLD AUTO: 11.1 K/UL (ref 4.8–10.8)

## 2022-08-31 PROCEDURE — 700102 HCHG RX REV CODE 250 W/ 637 OVERRIDE(OP): Performed by: STUDENT IN AN ORGANIZED HEALTH CARE EDUCATION/TRAINING PROGRAM

## 2022-08-31 PROCEDURE — A9270 NON-COVERED ITEM OR SERVICE: HCPCS | Performed by: STUDENT IN AN ORGANIZED HEALTH CARE EDUCATION/TRAINING PROGRAM

## 2022-08-31 PROCEDURE — 80053 COMPREHEN METABOLIC PANEL: CPT

## 2022-08-31 PROCEDURE — 51798 US URINE CAPACITY MEASURE: CPT

## 2022-08-31 PROCEDURE — 700105 HCHG RX REV CODE 258: Performed by: STUDENT IN AN ORGANIZED HEALTH CARE EDUCATION/TRAINING PROGRAM

## 2022-08-31 PROCEDURE — 700111 HCHG RX REV CODE 636 W/ 250 OVERRIDE (IP): Performed by: STUDENT IN AN ORGANIZED HEALTH CARE EDUCATION/TRAINING PROGRAM

## 2022-08-31 PROCEDURE — HZ2ZZZZ DETOXIFICATION SERVICES FOR SUBSTANCE ABUSE TREATMENT: ICD-10-PCS | Performed by: STUDENT IN AN ORGANIZED HEALTH CARE EDUCATION/TRAINING PROGRAM

## 2022-08-31 PROCEDURE — 85025 COMPLETE CBC W/AUTO DIFF WBC: CPT

## 2022-08-31 PROCEDURE — 700101 HCHG RX REV CODE 250

## 2022-08-31 PROCEDURE — 83605 ASSAY OF LACTIC ACID: CPT

## 2022-08-31 PROCEDURE — 93005 ELECTROCARDIOGRAM TRACING: CPT

## 2022-08-31 PROCEDURE — 99222 1ST HOSP IP/OBS MODERATE 55: CPT | Mod: GC | Performed by: PSYCHIATRY & NEUROLOGY

## 2022-08-31 PROCEDURE — 99233 SBSQ HOSP IP/OBS HIGH 50: CPT | Performed by: STUDENT IN AN ORGANIZED HEALTH CARE EDUCATION/TRAINING PROGRAM

## 2022-08-31 PROCEDURE — 770020 HCHG ROOM/CARE - TELE (206)

## 2022-08-31 PROCEDURE — 82962 GLUCOSE BLOOD TEST: CPT | Mod: 91

## 2022-08-31 PROCEDURE — 36415 COLL VENOUS BLD VENIPUNCTURE: CPT

## 2022-08-31 PROCEDURE — 93010 ELECTROCARDIOGRAM REPORT: CPT | Performed by: INTERNAL MEDICINE

## 2022-08-31 PROCEDURE — 84484 ASSAY OF TROPONIN QUANT: CPT

## 2022-08-31 RX ORDER — LORAZEPAM 2 MG/1
2 TABLET ORAL
Status: DISCONTINUED | OUTPATIENT
Start: 2022-08-31 | End: 2022-09-01 | Stop reason: HOSPADM

## 2022-08-31 RX ORDER — LORAZEPAM 0.5 MG/1
0.5 TABLET ORAL EVERY 4 HOURS PRN
Status: DISCONTINUED | OUTPATIENT
Start: 2022-08-31 | End: 2022-09-01 | Stop reason: HOSPADM

## 2022-08-31 RX ORDER — POTASSIUM CHLORIDE 20 MEQ/1
60 TABLET, EXTENDED RELEASE ORAL ONCE
Status: COMPLETED | OUTPATIENT
Start: 2022-08-31 | End: 2022-08-31

## 2022-08-31 RX ORDER — DIVALPROEX SODIUM 250 MG/1
250 TABLET, DELAYED RELEASE ORAL 3 TIMES DAILY
Status: DISCONTINUED | OUTPATIENT
Start: 2022-08-31 | End: 2022-08-31 | Stop reason: ALTCHOICE

## 2022-08-31 RX ORDER — LORAZEPAM 2 MG/1
4 TABLET ORAL
Status: DISCONTINUED | OUTPATIENT
Start: 2022-08-31 | End: 2022-09-01 | Stop reason: HOSPADM

## 2022-08-31 RX ORDER — LORAZEPAM 1 MG/1
1 TABLET ORAL EVERY 4 HOURS PRN
Status: DISCONTINUED | OUTPATIENT
Start: 2022-08-31 | End: 2022-09-01 | Stop reason: HOSPADM

## 2022-08-31 RX ORDER — METOPROLOL TARTRATE 1 MG/ML
5 INJECTION, SOLUTION INTRAVENOUS
Status: DISCONTINUED | OUTPATIENT
Start: 2022-08-31 | End: 2022-09-01 | Stop reason: HOSPADM

## 2022-08-31 RX ORDER — DIAZEPAM 5 MG/ML
5 INJECTION, SOLUTION INTRAMUSCULAR; INTRAVENOUS
Status: DISCONTINUED | OUTPATIENT
Start: 2022-08-31 | End: 2022-09-01 | Stop reason: HOSPADM

## 2022-08-31 RX ADMIN — ASPIRIN 81 MG: 81 TABLET, COATED ORAL at 07:00

## 2022-08-31 RX ADMIN — CLONAZEPAM 1 MG: 0.5 TABLET ORAL at 06:55

## 2022-08-31 RX ADMIN — PIPERACILLIN SODIUM AND TAZOBACTAM SODIUM 3.38 G: 3; .375 INJECTION, POWDER, FOR SOLUTION INTRAVENOUS at 05:05

## 2022-08-31 RX ADMIN — METOPROLOL TARTRATE 5 MG: 1 INJECTION, SOLUTION INTRAVENOUS at 05:05

## 2022-08-31 RX ADMIN — CLONAZEPAM 1 MG: 0.5 TABLET ORAL at 18:05

## 2022-08-31 RX ADMIN — INSULIN GLARGINE-YFGN 25 UNITS: 100 INJECTION, SOLUTION SUBCUTANEOUS at 18:05

## 2022-08-31 RX ADMIN — RIVAROXABAN 15 MG: 15 TABLET, FILM COATED ORAL at 18:05

## 2022-08-31 RX ADMIN — DOCUSATE SODIUM 50 MG AND SENNOSIDES 8.6 MG 2 TABLET: 8.6; 5 TABLET, FILM COATED ORAL at 18:04

## 2022-08-31 RX ADMIN — GABAPENTIN 300 MG: 300 CAPSULE ORAL at 12:44

## 2022-08-31 RX ADMIN — GABAPENTIN 300 MG: 300 CAPSULE ORAL at 06:55

## 2022-08-31 RX ADMIN — GABAPENTIN 300 MG: 300 CAPSULE ORAL at 18:05

## 2022-08-31 RX ADMIN — PIPERACILLIN SODIUM AND TAZOBACTAM SODIUM 3.38 G: 3; .375 INJECTION, POWDER, FOR SOLUTION INTRAVENOUS at 15:30

## 2022-08-31 RX ADMIN — DIVALPROEX SODIUM 250 MG: 250 TABLET, DELAYED RELEASE ORAL at 12:44

## 2022-08-31 RX ADMIN — ATORVASTATIN CALCIUM 80 MG: 80 TABLET, FILM COATED ORAL at 22:06

## 2022-08-31 RX ADMIN — POTASSIUM CHLORIDE 60 MEQ: 20 TABLET, EXTENDED RELEASE ORAL at 08:12

## 2022-08-31 RX ADMIN — METOPROLOL SUCCINATE 100 MG: 50 TABLET, EXTENDED RELEASE ORAL at 06:55

## 2022-08-31 RX ADMIN — PIPERACILLIN SODIUM AND TAZOBACTAM SODIUM 3.38 G: 3; .375 INJECTION, POWDER, FOR SOLUTION INTRAVENOUS at 22:06

## 2022-08-31 ASSESSMENT — LIFESTYLE VARIABLES
SUBSTANCE_ABUSE: 0
AUDITORY DISTURBANCES: NOT PRESENT
ANXIETY: NO ANXIETY (AT EASE)
AGITATION: NORMAL ACTIVITY
ORIENTATION AND CLOUDING OF SENSORIUM: DISORIENTED FOR PLACE AND / OR PERSON
VISUAL DISTURBANCES: NOT PRESENT
TREMOR: TREMOR NOT VISIBLE BUT CAN BE FELT, FINGERTIP TO FINGERTIP
HEADACHE, FULLNESS IN HEAD: NOT PRESENT
NAUSEA AND VOMITING: NO NAUSEA AND NO VOMITING
AUDITORY DISTURBANCES: NOT PRESENT
TREMOR: TREMOR NOT VISIBLE BUT CAN BE FELT, FINGERTIP TO FINGERTIP
ORIENTATION AND CLOUDING OF SENSORIUM: DISORIENTED FOR PLACE AND / OR PERSON
NAUSEA AND VOMITING: NO NAUSEA AND NO VOMITING
VISUAL DISTURBANCES: VERY MILD SENSITIVITY
HEADACHE, FULLNESS IN HEAD: NOT PRESENT
ANXIETY: NO ANXIETY (AT EASE)
AGITATION: SOMEWHAT MORE THAN NORMAL ACTIVITY
PAROXYSMAL SWEATS: NO SWEAT VISIBLE
TOTAL SCORE: 5
TOTAL SCORE: 7
PAROXYSMAL SWEATS: NO SWEAT VISIBLE

## 2022-08-31 ASSESSMENT — ENCOUNTER SYMPTOMS
MEMORY LOSS: 1
FEVER: 0
WHEEZING: 0
NECK PAIN: 0
FOCAL WEAKNESS: 0
MYALGIAS: 1
FALLS: 1
CONSTIPATION: 0
DEPRESSION: 0
BACK PAIN: 0
HEADACHES: 0
DIZZINESS: 0
COUGH: 0
TREMORS: 0
DIARRHEA: 0
CLAUDICATION: 0
NAUSEA: 0
SENSORY CHANGE: 0
ABDOMINAL PAIN: 0
PALPITATIONS: 0
HEMOPTYSIS: 0
CHILLS: 0
WEAKNESS: 0
VOMITING: 0
SPUTUM PRODUCTION: 0
BLOOD IN STOOL: 0
SPEECH CHANGE: 0
BLURRED VISION: 0
DIAPHORESIS: 0
SHORTNESS OF BREATH: 0

## 2022-08-31 ASSESSMENT — FIBROSIS 4 INDEX
FIB4 SCORE: 0.99
FIB4 SCORE: 0.99

## 2022-08-31 NOTE — CARE PLAN
The patient is Watcher - Medium risk of patient condition declining or worsening    Shift Goals  Clinical Goals: Monitor mentation, bladder scan, safety, restraint checks  Patient Goals: VIRAJ  Family Goals: N/A    Progress made toward(s) clinical / shift goals:    Problem: Safety - Medical Restraint  Goal: Remains free of injury from restraints (Restraint for Interference with Medical Device)  Outcome: Progressing  Flowsheets (Taken 8/31/2022 0142)  Addressed this shift: Remains free of injury from restraints (restraint for interference with medical device):   Determine that other, less restrictive measures have been tried or would not be effective before applying the restraint   Evaluate the patient's condition at the time of restraint application   Inform patient/family regarding the reason for restraint   Every 2 hours: Monitor safety, psychosocial status, comfort, nutrition and hydration       Patient is not progressing towards the following goals:      Problem: Knowledge Deficit - Stroke Education  Goal: Patient's knowledge of stroke and risk factors will improve  Outcome: Not Progressing  Note: Patient does not follow commands/ education     Problem: Neuro Status  Goal: Neuro status will remain stable or improve  Outcome: Not Progressing  Note: Patient is altered, patient does not follow commands

## 2022-08-31 NOTE — CARE PLAN
"The patient is Stable - Low risk of patient condition declining or worsening    Shift Goals  Clinical Goals: Q4 neuro, MRI pending, safety  Patient Goals: \"go home\"  Family Goals: N/A    Progress made toward(s) clinical / shift goals:    Problem: Knowledge Deficit - Stroke Education  Goal: Patient's knowledge of stroke and risk factors will improve  Outcome: Progressing     Problem: Knowledge Deficit - Standard  Goal: Patient and family/care givers will demonstrate understanding of plan of care, disease process/condition, diagnostic tests and medications  Outcome: Progressing       Patient is not progressing towards the following goals:      "

## 2022-08-31 NOTE — PROGRESS NOTES
Assumed care this pm from day shift RN. Patient yelling out random words that do not make sense. Patient AxO VIRAJ, O2 @ RA, VSS. Call bell and personal items within reach, bed locked in low position. Bed exit alarm on. Hourly rounding in place. Tele box in place, monitor room notified.   Patient in bilateral wrist restraints with R ankle, with an active order

## 2022-08-31 NOTE — PROGRESS NOTES
Hospital Medicine Daily Progress Note    Date of Service  8/30/2022    Chief Complaint  Feli Vuong is a 66 y.o. female admitted 8/26/2022 with urinary source sepsis with hypotension.    Hospital Course  Feli Vuong is a 66 y.o. female with extensive medical history to include hypertension, hyperlipidemia, paroxysmal A. Flutter, diabetes mellitus, hypertension, hyperlipidemia, bipolar affective disorder, sick sinus syndrome status post pacemaker placement who presented 8/26/2022 as a transfer from OSH for STEMI (cardiology determined no STEMI).     Patient presented to [facility] after being found on the ground syncope versus ground-level fall (story is unclear) with complaint of right hip pain.  Reports that she was having slurred speech and possible facial droop and there was concerns for stroke.  On chart review she was not reporting any chest pain on arrival to OSH.  EKG had concerns of STEMI, she was given 1 L normal saline for soft blood sugars and full dose aspirin, troponin noted to be 0.04 Pro-Jj 0.15 elevated BUN/serum creatinine.  Patient was sent to [facility] for further evaluation.  At their facility she underwent CT head without negative for acute intracranial abnormality, CTA head and neck negative for CVA, LVO, aneurysm.  Right hip x-ray negative for acute fracture or dislocation.  EKG AV dual paced rhythm, no STEMI.   Patient hypotensive and UA +, diagnosed with sepsis.  BCs, UCx sent, on Rocephin IV.  Responding to IVFs.  MARCE improving and BP improved.  Patient c/o dysuria at home, lives alone.  Given solumedrol for acute stress related adrenal crises.    Interval Problem Update  8/27:  continue IVFs and antibiotics.  Cr improving 1.46 to 1.29.  wbc 13.  Patient meets inpatient criteria for severe sepsis.  Patient awakens to tell me that she has had bladder pain for the past week.  Burning with urination.  She lives alone.  It is unclear why she did not seek medical attention  sooner.  I do not detect any gross neurological deficit on exam.  8/28: Patient reports still feeling confused but better than yesterday.  No further episodes of hypoglycemia but now she has hyperglycemia.  We will increase her insulin.  MRI pending and awaiting pacemaker rep.   8/29: Patient still appears confused.  I spoke with the patient's daughter on the phone.  The patient's daughter lives in [place] and the grandson lives across the street from the patient but is frequently in [facility].  The daughter reports that the patient has been having confusion since February 2022 when she was diagnosed with COVID.  She reports that the patient appears to frequently forget her medications.  She reports that the patient has had multiple episodes where her glucose was above 500 and other episodes where it was less than 100.  Patient has also had multiple falls in the last few months.    8/30/2022:  Rapid response and code stroke was initiated on patient today for increasing encephalopathy.  Upon my arrival to bedside patient appears to be encephalopathic however no evidence of focality.  Imaging including CT perfusion and CTA of the head and neck in addition to CT without contrast of the head all indicated no acute changes indicating cerebrovascular accident.  Patient did become a hyperthermic up to 102.5 for unclear reasons.  I implemented the following  1.  Aggressive cooling measures including ice packs  2.  Broad-spectrum antimicrobials discontinue ceftriaxone switch to Zosyn  3.  Rectal Tylenol  4.  Stat CK in addition to lactic acid  5.  Pharmacy reviewed reviewed home medications patient had been getting gabapentin 3600 mg daily reinstituted this medication  6.  Diazepam 5 mg intramuscular as needed for agitation.    Patient was critically ill during the time I treated the patient.  He had hyperthermia and was at risk for neuro epileptic malignant syndrome/gabapentin withdrawal cardiopulmonary arrest and  death  I provided aggressive cooling measures intramuscular diazepam to prevent cardiopulmonary arrest death and seizure    35 minutes of critical care time were spent, including examination and interview of the patient, explanation of prognosis/diagnosis/plan of care, direction of nursing staff and discussion of the patient with other physicians involved.  This time was independent of procedures performed.    Greater than 50% of which was spent at the bedside.   I have discussed this patient's plan of care and discharge plan at IDT rounds today with Case Management, Nursing, Nursing leadership, and other members of the IDT team.    Consultants/Specialty  cardiology    Code Status  Full Code    Disposition  Patient is not medically cleared for discharge.   Anticipate discharge to to home with close outpatient follow-up.  I have placed the appropriate orders for post-discharge needs.    Review of Systems  Review of Systems   Constitutional:  Positive for malaise/fatigue. Negative for chills, diaphoresis and fever.   Respiratory:  Negative for cough, hemoptysis, sputum production, shortness of breath and wheezing.    Cardiovascular:  Negative for chest pain, palpitations, claudication and leg swelling.   Gastrointestinal:  Negative for abdominal pain, constipation, diarrhea, melena, nausea and vomiting.   Genitourinary:  Negative for dysuria, frequency and urgency.   Musculoskeletal:  Positive for falls and myalgias. Negative for back pain, joint pain and neck pain.   Neurological:  Negative for dizziness, sensory change, speech change, focal weakness, weakness and headaches.   Psychiatric/Behavioral:  Positive for memory loss. Negative for depression, substance abuse and suicidal ideas.    All other systems reviewed and are negative.     Physical Exam  Temp:  [36 °C (96.8 °F)-38.4 °C (101.2 °F)] 38.4 °C (101.2 °F)  Pulse:  [] 94  Resp:  [16-22] 20  BP: (130-187)/() 144/101  SpO2:  [95 %-98 %] 95  %    Physical Exam  Vitals reviewed.   Constitutional:       General: She is in acute distress.      Appearance: She is well-developed. She is ill-appearing. She is not diaphoretic.   HENT:      Head: Normocephalic and atraumatic.      Right Ear: External ear normal.      Left Ear: External ear normal.      Mouth/Throat:      Pharynx: No oropharyngeal exudate.   Eyes:      General: No scleral icterus.        Right eye: No discharge.         Left eye: No discharge.   Neck:      Thyroid: No thyromegaly.      Vascular: No JVD.      Trachea: No tracheal deviation.      Comments: Patient does have marked goiter  Cardiovascular:      Rate and Rhythm: Normal rate. Rhythm irregular.      Heart sounds: Normal heart sounds.     No friction rub. No gallop.      Comments: Pacemaker pocket nonerythematous  Pulmonary:      Effort: Pulmonary effort is normal. No respiratory distress.      Breath sounds: Normal breath sounds. No stridor. No wheezing or rales.   Chest:      Chest wall: No tenderness.   Abdominal:      General: Bowel sounds are normal. There is no distension.      Palpations: Abdomen is soft. There is no mass.      Tenderness: There is no abdominal tenderness. There is no guarding or rebound.   Genitourinary:     Comments: [place] with dark cloudy urine  Musculoskeletal:         General: No tenderness. Normal range of motion.      Cervical back: Normal range of motion and neck supple.      Comments: No evidence of muscle rigidity   Lymphadenopathy:      Cervical: No cervical adenopathy.   Skin:     General: Skin is warm and dry.      Findings: No erythema or rash.   Neurological:      General: No focal deficit present.      Mental Status: She is oriented to person, place, and time.      Cranial Nerves: No cranial nerve deficit.      Motor: No abnormal muscle tone.      Coordination: Coordination normal.   Psychiatric:         Mood and Affect: Affect is labile.      Comments: Patient not oriented agitated        Fluids    Intake/Output Summary (Last 24 hours) at 8/30/2022 1713  Last data filed at 8/30/2022 1504  Gross per 24 hour   Intake --   Output 0 ml   Net 0 ml       Laboratory  Recent Labs     08/29/22  0142 08/30/22  0115 08/30/22  1255   WBC 9.7 12.3* 13.3*   RBC 4.27 5.14 4.93   HEMOGLOBIN 12.9 15.2 14.8   HEMATOCRIT 37.5 44.3 42.3   MCV 87.8 86.2 85.8   MCH 30.2 29.6 30.0   MCHC 34.4 34.3 35.0   RDW 41.1 39.5 39.4   PLATELETCT 344 439 436   MPV 9.6 9.8 9.5     Recent Labs     08/29/22  0142 08/30/22  0115 08/30/22  1255   SODIUM 142 140 136   POTASSIUM 3.3* 3.2* 3.9   CHLORIDE 105 102 98   CO2 24 23 22   GLUCOSE 113* 109* 187*   BUN 11 10 13   CREATININE 0.71 0.79 0.87   CALCIUM 10.1 10.5 10.3     Recent Labs     08/30/22  1255   APTT 30.1   INR 0.97               Imaging  DX-CHEST-PORTABLE (1 VIEW)   Final Result      1.  There is no acute pneumonia or edema.   2.  Unusual vertically oriented linear lucency in the medial right hemithorax, potentially mediastinal air albeit not in a typical location.      CT-CEREBRAL PERFUSION ANALYSIS   Final Result      1.  Cerebral blood flow less than 30% likely representing completed infarct = 0 mL.      2.  T Max more than 6 seconds likely representing combination of completed infarct and ischemia = 0 mL.      3.  Mismatched volume likely representing ischemic brain/penumbra = None      4.  Please note that the cerebral perfusion was performed on the limited brain tissue around the basal ganglia region. Infarct/ischemia outside the CT perfusion sections can be missed in this study.      CT-CTA NECK WITH & W/O-POST PROCESSING   Final Result   Addendum (preliminary) 1 of 1   Addendum: There is marked goitrous enlargement of the left lobe of the    thyroid gland with substernal extension.      Final      CT angiogram of the neck within normal limits.      CT-CTA HEAD WITH & W/O-POST PROCESS   Final Result      CT angiogram of the [place] within normal limits.       CT-HEAD W/O   Final Result      Head CT without contrast within normal limits. No evidence of acute cerebral infarction, hemorrhage or mass lesion.         EC-ECHOCARDIOGRAM COMPLETE W/O CONT   Final Result      DX-CHEST-LIMITED (1 VIEW)   Final Result         No acute cardiac or pulmonary abnormality is identified.      CT-FOREIGN FILM CAT SCAN   Final Result      OUTSIDE IMAGES-CT HEAD   Final Result      OUTSIDE IMAGES-DX CHEST   Final Result      KI-SXINSEN-HCBILQN FILM X-RAY   Final Result      MR-BRAIN-W/O    (Results Pending)        Assessment/Plan  * Sepsis associated hypotension (HCC)- (present on admission)  Assessment & Plan  This is Sepsis Present on admission  SIRS criteria identified on my evaluation include: Tachycardia, with heart rate greater than 90 BPM, Leukocytosis, with WBC greater than 12,000 and Bandemia, greater than 10% bands  Source is urinary  Sepsis protocol initiated  Fluid resuscitation ordered per protocol  Crystalloid Fluid Administration: Fluid resuscitation ordered per standard protocol - 30 mL/kg per current or ideal body weight  IV antibiotics as appropriate for source of sepsis  Reassessment: I have reassessed the patient's hemodynamic status  BCs x2   UCx sent  8/30/2022:  Discontinue ceftriaxone to start Zosyn chest x-ray presently pending lactic acid pending          Metabolic encephalopathy- (present on admission)  Assessment & Plan  -CT imaging to include angiography negative at OSH for CVA/LVO/stenosis/aneurysm, will obtain MRI  -vitals and neuro checks q4h  -IVF  -Limit sedatives, attempt to minimize risk of delirium such as avoiding day time napping and promote night time sleep, frequent reorientation, monitor for constipation, remove lines/tubing not needed, avoid early lab draws and vital checks, limit polypharmacy as able, and keep close to the window  -Aspiration precautions  HIV, TSH, RPR ordered  8/30/2022:  Continue present medical therapy unclear etiology of  patient's encephalopathy patient had been taking high-dose gabapentin at home 3600 mg reasons to lower dose gabapentin while hospitalized provided intramuscular diazepam as patient does not have IV access at present.  Patient does have significant psychiatric history.           Hypotension- (present on admission)  Assessment & Plan  While in ED BP trended down to the 60's. Responded to IVF bolus.  Given the hypotension and encephalopathy with somnolence   +UA, urine culture,  blood cultures, cortisol, started ceftriaxone for sepsis.  BP immediately improved with IVF bolus, mentation improved.   Cortisol checked and was low, 12. I will provide one dose of Solu-Cortef 100 mg IV but I suspect her hypotension was more from dehydration.  If any further episodes of hypotension continues with stress dose steroids.  8/30/2022:  -Resolved    Acute cystitis- (present on admission)  Assessment & Plan  IV ceftriaxone, follow-up urine and blood cultures.  Appears sepsis with urinary source causing hypotension, syncope.  8/30/2022:  Discontinue ceftriaxone initiate Zosyn.    Elevated troponin- (present on admission)  Assessment & Plan  Mildly elevated 22, no ongoing chest pain, had mild chest pain on arrival since resolved.  EKG shows A-V dual-paced rhythm with some inhibition  Will trend  -Resolved    Hypokalemia- (present on admission)  Assessment & Plan  K 3.3 KCL ordered    MARCE (acute kidney injury) (HCC)- (present on admission)  Assessment & Plan  UTI + Pre-renal etiology with syncope and hypotension.    Rule out post obstruction  IVF  Renal dose meds and avoid nephrotoxins  Monitor I&O's  Resolved      TIA (transient ischemic attack)- (present on admission)  Assessment & Plan  Patient had symptoms speech difficulties reported facial droop concern for stroke at OSH CT neuroimaging to include angiography was negative for acute CVA/LVO/aneurysm/significant stenosis.  MRI, however pacemaker is not compatible with MRI, will need  [employer] rep to shut off device for MRI and turn on post MRI.  Monitor on telemetry  Continue statin  8/30/2022:  Patient has code stroke called today CTA imaging and perfusion scans all of which were negative.  Continue to monitor clinically.  Neurology recommendations appreciated.    Syncope- (present on admission)  Assessment & Plan  Hypotensive in ER 2/2 sepsis-urinary source.  Monitor on telemetry, check orthostatic vitals, TTE.  IV fluid  -Resolved    Paroxysmal atrial flutter (HCC)- (present on admission)  Assessment & Plan  Patient states she cannot remember the last time she took her medications, in regards to cardiac meds on her MAR metoprolol, diltiazem, digoxin, flecainide, Xarelto.  Cardiology consulted in ED and following, can discuss with them in the morning in regards to an appropriate regimen.  Check dig level.  Monitor on telemetry  8/30/2022:  Continue patient's home dosing of Xarelto continue present cardiac medications.    Mixed hyperlipidemia- (present on admission)  Assessment & Plan  Continue statin    Diabetes mellitus, type II (HCC)- (present on admission)  Assessment & Plan  Patient with a history of type 2 diabetes.  A1c 8.5.  Daughter reports that the patient is poorly controlled at home she is frequently hyperglycemic and hypoglycemic.  At home patient is on glargine 70 units and metformin.  She initially presented hypoglycemic, metformin held and will restart the patient on Lantus but at 25 units and gradually increase.  She has been hyperglycemic.  The patient on regular insulin 6 units 3 times daily AC meals and continue the sliding scale   Diabetic diet    Bipolar affective (HCC)- (present on admission)  Assessment & Plan  Medications on MAR mirtazapine, lithium, risperidone, clonazepam, quetiapine although patient states she does not take any.  8/30/2022:  Consult with psychiatry for medication adjustment/evaluation recommendations appreciated      Sick sinus syndrome  (HCC)- (present on admission)  Assessment & Plan  Status post PPM placement     Please note that this dictation was created using voice recognition software. I have made every reasonable attempt to correct obvious errors, but I expect that there are errors of grammar and possibly context that I did not discover before finalizing the note.    VTE prophylaxis: therapeutic anticoagulation with Xarelto    I have performed a physical exam and reviewed and updated ROS and Plan today (8/30/2022). In review of yesterday's note (8/29/2022), there are no changes except as documented above.

## 2022-08-31 NOTE — PROGRESS NOTES
"Patient alert. Patient able to answer questions. Patient could state her name/ birthday, told this RN that it was September, \"it's almost my grandsons birthday\", patient could state that she was in the hospital after some prompting. Patient passed bedside swallow evaluation, MD Ratliff at bedside. This RN went back and was able to give the morning medications whole in applesauce. Patient was helped up to the restroom. Patient able to verbalize understanding of fall precautions/ the use of the call light. Patient was removed from her restraints at this time.   "
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
## 2022-08-31 NOTE — PROGRESS NOTES
Monitor room notified that patient's HR was sustaining in the 130s with pacer beats mixed in. Patient was SR with no pacer spikes throughout the the night. Rhythm strip reviewed. / 82. Patient asymptomatic. MD Trever Sinha notified. MD bedside reviewing monitor. Orders received.

## 2022-08-31 NOTE — DISCHARGE PLANNING
Case Management Discharge Planning    Admission Date: 8/26/2022  GMLOS: 4.8  ALOS: 4    6-Clicks ADL Score: 24  6-Clicks Mobility Score: 20      Anticipated Discharge Dispo: Discharge Disposition: D/T to home under HHA care in anticipation of covered skilled care (06)  [address] [facility] [facility] [address]  Discharge Contact Phone Number: [phone]    DME Needed: No    Action(s) Taken: Patient discussed during IDT rounds, patient will likely be clear for discharge tomorrow.  RN CM was notified by bedside GERSON Rosales that the patient likely won't have a ride home available tomorrow.  Patient has transportation benefits with [employer].  RN CM called [facility] member services at [phone] to schedule a ride for the patient.  GERSON LINDO spoke with Wayne.  GERSON LINDO explained that the patient will be ready for discharge tomorrow, provided addresses for pickup and drop off.  Wayne informed this GERSON LINDO that confirmation of a ride will take some time, member services will call back this RN CM once transport is confirmed.  Reservation number for the patient is [id number].    Escalations Completed: Transportation Vendor    Medically Clear: No, likely clear tomorrow    Next Steps: Case coordination to follow up with patient's transportation benefits.    Barriers to Discharge: Transportation    Is the patient up for discharge tomorrow: Yes    Is transport arranged for discharge disposition: Pending

## 2022-08-31 NOTE — PROGRESS NOTES
NOC HOSPITALIST CROSS COVER    Notified by RN regarding persistent tachycardia and ectopy/pacer beats on monitor. Patient has a history of afib/flutter and is unsure when she last took her medication. Patient is currently being worked up for metabolic encephalopathy and is unable to verbalize symptomatology. 12 lead EKG ordered and evaluated, suspicious for afib with RVR. 5 mg IV metoprolol ordered x 3 PRN for persistent tachycardia >130 bpm sustained. After 1st dose of metoprolol the patient's HR returned to the 60s. On repeat EKG the patient was in normal sinus rhythm with a rate of 69. On EKG, ST depression noted in several leads. Stat troponin ordered.             A/P:  #Afib w/ RVR  -5 mg IV Metoprolol  -Follow stat troponin      -----------------------------------------------------------------------------------------------------------    Electronically signed by:  ZOYA Sinha PA-C  Hospitalist Services

## 2022-09-01 ENCOUNTER — PHARMACY VISIT (OUTPATIENT)
Dept: PHARMACY | Facility: MEDICAL CENTER | Age: 67
End: 2022-09-01
Payer: COMMERCIAL

## 2022-09-01 VITALS
HEART RATE: 68 BPM | TEMPERATURE: 97 F | SYSTOLIC BLOOD PRESSURE: 105 MMHG | WEIGHT: 139.99 LBS | HEIGHT: 62 IN | RESPIRATION RATE: 18 BRPM | OXYGEN SATURATION: 93 % | BODY MASS INDEX: 25.76 KG/M2 | DIASTOLIC BLOOD PRESSURE: 54 MMHG

## 2022-09-01 LAB
ANION GAP SERPL CALC-SCNC: 10 MMOL/L (ref 7–16)
BACTERIA BLD CULT: NORMAL
BACTERIA BLD CULT: NORMAL
BASOPHILS # BLD AUTO: 0.4 % (ref 0–1.8)
BASOPHILS # BLD: 0.05 K/UL (ref 0–0.12)
BUN SERPL-MCNC: 23 MG/DL (ref 8–22)
CALCIUM SERPL-MCNC: 9.5 MG/DL (ref 8.5–10.5)
CHLORIDE SERPL-SCNC: 102 MMOL/L (ref 96–112)
CO2 SERPL-SCNC: 23 MMOL/L (ref 20–33)
CREAT SERPL-MCNC: 1.13 MG/DL (ref 0.5–1.4)
EOSINOPHIL # BLD AUTO: 0.31 K/UL (ref 0–0.51)
EOSINOPHIL NFR BLD: 2.6 % (ref 0–6.9)
ERYTHROCYTE [DISTWIDTH] IN BLOOD BY AUTOMATED COUNT: 41 FL (ref 35.9–50)
GFR SERPLBLD CREATININE-BSD FMLA CKD-EPI: 53 ML/MIN/1.73 M 2
GLUCOSE BLD STRIP.AUTO-MCNC: 117 MG/DL (ref 65–99)
GLUCOSE BLD STRIP.AUTO-MCNC: 153 MG/DL (ref 65–99)
GLUCOSE BLD STRIP.AUTO-MCNC: 187 MG/DL (ref 65–99)
GLUCOSE SERPL-MCNC: 106 MG/DL (ref 65–99)
HCT VFR BLD AUTO: 39.1 % (ref 37–47)
HGB BLD-MCNC: 13.4 G/DL (ref 12–16)
IMM GRANULOCYTES # BLD AUTO: 0.09 K/UL (ref 0–0.11)
IMM GRANULOCYTES NFR BLD AUTO: 0.8 % (ref 0–0.9)
LYMPHOCYTES # BLD AUTO: 3.12 K/UL (ref 1–4.8)
LYMPHOCYTES NFR BLD: 26.2 % (ref 22–41)
MCH RBC QN AUTO: 30 PG (ref 27–33)
MCHC RBC AUTO-ENTMCNC: 34.3 G/DL (ref 33.6–35)
MCV RBC AUTO: 87.5 FL (ref 81.4–97.8)
MONOCYTES # BLD AUTO: 0.87 K/UL (ref 0–0.85)
MONOCYTES NFR BLD AUTO: 7.3 % (ref 0–13.4)
NEUTROPHILS # BLD AUTO: 7.49 K/UL (ref 2–7.15)
NEUTROPHILS NFR BLD: 62.7 % (ref 44–72)
NRBC # BLD AUTO: 0 K/UL
NRBC BLD-RTO: 0 /100 WBC
PLATELET # BLD AUTO: 356 K/UL (ref 164–446)
PMV BLD AUTO: 9.1 FL (ref 9–12.9)
POTASSIUM SERPL-SCNC: 3.3 MMOL/L (ref 3.6–5.5)
RBC # BLD AUTO: 4.47 M/UL (ref 4.2–5.4)
SIGNIFICANT IND 70042: NORMAL
SIGNIFICANT IND 70042: NORMAL
SITE SITE: NORMAL
SITE SITE: NORMAL
SODIUM SERPL-SCNC: 135 MMOL/L (ref 135–145)
SOURCE SOURCE: NORMAL
SOURCE SOURCE: NORMAL
WBC # BLD AUTO: 11.9 K/UL (ref 4.8–10.8)

## 2022-09-01 PROCEDURE — 36415 COLL VENOUS BLD VENIPUNCTURE: CPT

## 2022-09-01 PROCEDURE — RXMED WILLOW AMBULATORY MEDICATION CHARGE: Performed by: STUDENT IN AN ORGANIZED HEALTH CARE EDUCATION/TRAINING PROGRAM

## 2022-09-01 PROCEDURE — 700102 HCHG RX REV CODE 250 W/ 637 OVERRIDE(OP): Performed by: STUDENT IN AN ORGANIZED HEALTH CARE EDUCATION/TRAINING PROGRAM

## 2022-09-01 PROCEDURE — 99239 HOSP IP/OBS DSCHRG MGMT >30: CPT | Performed by: STUDENT IN AN ORGANIZED HEALTH CARE EDUCATION/TRAINING PROGRAM

## 2022-09-01 PROCEDURE — 85025 COMPLETE CBC W/AUTO DIFF WBC: CPT

## 2022-09-01 PROCEDURE — A9270 NON-COVERED ITEM OR SERVICE: HCPCS | Performed by: STUDENT IN AN ORGANIZED HEALTH CARE EDUCATION/TRAINING PROGRAM

## 2022-09-01 PROCEDURE — 80048 BASIC METABOLIC PNL TOTAL CA: CPT

## 2022-09-01 PROCEDURE — 82962 GLUCOSE BLOOD TEST: CPT

## 2022-09-01 RX ORDER — POTASSIUM CHLORIDE 20 MEQ/1
60 TABLET, EXTENDED RELEASE ORAL ONCE
Status: COMPLETED | OUTPATIENT
Start: 2022-09-01 | End: 2022-09-01

## 2022-09-01 RX ORDER — ASPIRIN 81 MG/1
81 TABLET ORAL DAILY
Qty: 30 TABLET | Refills: 3 | Status: SHIPPED | OUTPATIENT
Start: 2022-09-02

## 2022-09-01 RX ADMIN — CLONAZEPAM 1 MG: 0.5 TABLET ORAL at 05:10

## 2022-09-01 RX ADMIN — POTASSIUM CHLORIDE 60 MEQ: 20 TABLET, EXTENDED RELEASE ORAL at 11:59

## 2022-09-01 RX ADMIN — GABAPENTIN 300 MG: 300 CAPSULE ORAL at 05:11

## 2022-09-01 RX ADMIN — ASPIRIN 81 MG: 81 TABLET, COATED ORAL at 05:10

## 2022-09-01 RX ADMIN — DOCUSATE SODIUM 50 MG AND SENNOSIDES 8.6 MG 2 TABLET: 8.6; 5 TABLET, FILM COATED ORAL at 05:10

## 2022-09-01 NOTE — CARE PLAN
The patient is Watcher - Medium risk of patient condition declining or worsening    Shift Goals  Clinical Goals: monitor u/o and mental stauts  Patient Goals: rush  Family Goals: na    Progress made toward(s) clinical / shift goals:      Problem: Optimal Care of the Stroke Patient  Goal: Optimal emergency care for the stroke patient  Outcome: Progressing  Goal: Optimal acute care for the stroke patient  Outcome: Progressing     Problem: Psychosocial - Patient Condition  Goal: Patient's ability to verbalize feelings about condition will improve  Outcome: Progressing     Problem: Discharge Planning - Stroke  Goal: Ensure Stroke Core Measures are met prior to discharge  Outcome: Progressing       Patient is not progressing towards the following goals:

## 2022-09-01 NOTE — CARE PLAN
The patient is Stable - Low risk of patient condition declining or worsening    Shift Goals  Clinical Goals: Monitor mentation, bladder scan, safety, restraint checks  Patient Goals: VIRAJ  Family Goals: N/A    Progress made toward(s) clinical / shift goals:    Problem: Knowledge Deficit - Stroke Education  Goal: Patient's knowledge of stroke and risk factors will improve  Outcome: Progressing     Problem: Self Care  Goal: Patient will have the ability to perform ADLs independently or with assistance (bathe, groom, dress, toilet and feed)  Outcome: Progressing     Problem: Knowledge Deficit - Standard  Goal: Patient and family/care givers will demonstrate understanding of plan of care, disease process/condition, diagnostic tests and medications  Outcome: Progressing       Patient is not progressing towards the following goals:

## 2022-09-01 NOTE — PROGRESS NOTES
Hospital Medicine Daily Progress Note    Date of Service  8/31/2022    Chief Complaint  Feli Vuong is a 66 y.o. female admitted 8/26/2022 with urinary source sepsis with hypotension.    Hospital Course  Feli Vuong is a 66 y.o. female with extensive medical history to include hypertension, hyperlipidemia, paroxysmal A. Flutter, diabetes mellitus, hypertension, hyperlipidemia, bipolar affective disorder, sick sinus syndrome status post pacemaker placement who presented 8/26/2022 as a transfer from OSH for STEMI (cardiology determined no STEMI).     Patient presented [facility] after being found on the ground syncope versus ground-level fall (story is unclear) with complaint of right hip pain.  Reports that she was having slurred speech and possible facial droop and there was concerns for stroke.  On chart review she was not reporting any chest pain on arrival to OSH.  EKG had concerns of STEMI, she was given 1 L normal saline for soft blood sugars and full dose aspirin, troponin noted to be 0.04 Pro-Jj 0.15 elevated BUN/serum creatinine.  Patient was sent to [facility] for further evaluation.  At their facility she underwent CT head without negative for acute intracranial abnormality, CTA head and neck negative for CVA, LVO, aneurysm.  Right hip x-ray negative for acute fracture or dislocation.  EKG AV dual paced rhythm, no STEMI.   Patient hypotensive and UA +, diagnosed with sepsis.  BCs, UCx sent, on Rocephin IV.  Responding to IVFs.  MARCE improving and BP improved.  Patient c/o dysuria at home, lives alone.  Given solumedrol for acute stress related adrenal crises.    Interval Problem Update  8/27:  continue IVFs and antibiotics.  Cr improving 1.46 to 1.29.  wbc 13.  Patient meets inpatient criteria for severe sepsis.  Patient awakens to tell me that she has had bladder pain for the past week.  Burning with urination.  She lives alone.  It is unclear why she did not seek medical attention  sooner.  I do not detect any gross neurological deficit on exam.  8/28: Patient reports still feeling confused but better than yesterday.  No further episodes of hypoglycemia but now she has hyperglycemia.  We will increase her insulin.  MRI pending and awaiting pacemaker rep.   8/29: Patient still appears confused.  I spoke with the patient's daughter on the phone.  The patient's daughter lives in [place] and the grandson lives across the street from the patient but is frequently in [facility].  The daughter reports that the patient has been having confusion since February 2022 when she was diagnosed with COVID.  She reports that the patient appears to frequently forget her medications.  She reports that the patient has had multiple episodes where her glucose was above 500 and other episodes where it was less than 100.  Patient has also had multiple falls in the last few months.    8/30/2022:  Rapid response and code stroke was initiated on patient today for increasing encephalopathy.  Upon my arrival to bedside patient appears to be encephalopathic however no evidence of focality.  Imaging including CT perfusion and CTA of the head and neck in addition to CT without contrast of the head all indicated no acute changes indicating cerebrovascular accident.  Patient did become a hyperthermic up to 102.5 for unclear reasons.  I implemented the following  1.  Aggressive cooling measures including ice packs  2.  Broad-spectrum antimicrobials discontinue ceftriaxone switch to Zosyn  3.  Rectal Tylenol  4.  Stat CK in addition to lactic acid  5.  Pharmacy reviewed reviewed home medications patient had been getting gabapentin 3600 mg daily reinstituted this medication  6.  Diazepam 5 mg intramuscular as needed for agitation.    8/31/2022:  Patient's condition dramatically improved today.  After further chart review and discussion with psychiatry, who is recommendations are appreciated, patient likely suffering from  clonazepam withdrawals which will be handled similarly to alcohol withdrawals.  I have instituted CIWA protocol.  Furthermore I have continue with patient's clonazepam which she was taking previously at home.  QTC was markedly prolonged upon initial intake we will get a repeat EKG in the morning otherwise continue present medical management we will administer another 2 g of magnesium at present.  Anticipate discharge in 1 to 2 days.    I have discussed this patient's plan of care and discharge plan at IDT rounds today with Case Management, Nursing, Nursing leadership, and other members of the IDT team.    Consultants/Specialty  psychiatry    Code Status  Full Code    Disposition  Patient is not medically cleared for discharge.   Anticipate discharge to to home with close outpatient follow-up.  I have placed the appropriate orders for post-discharge needs.    Review of Systems  Review of Systems   Constitutional:  Positive for malaise/fatigue. Negative for chills, diaphoresis and fever.   Respiratory:  Negative for cough, hemoptysis, sputum production, shortness of breath and wheezing.    Cardiovascular:  Negative for chest pain, palpitations, claudication and leg swelling.   Gastrointestinal:  Negative for abdominal pain, constipation, diarrhea, melena, nausea and vomiting.   Genitourinary:  Negative for dysuria, frequency and urgency.   Musculoskeletal:  Positive for falls and myalgias. Negative for back pain, joint pain and neck pain.   Neurological:  Negative for dizziness, sensory change, speech change, focal weakness, weakness and headaches.   Psychiatric/Behavioral:  Positive for memory loss. Negative for depression, substance abuse and suicidal ideas.    All other systems reviewed and are negative.     Physical Exam  Temp:  [36.7 °C (98.1 °F)-37.4 °C (99.3 °F)] 37 °C (98.6 °F)  Pulse:  [] 69  Resp:  [16-18] 18  BP: ()/(56-82) 94/56  SpO2:  [93 %-97 %] 93 %    Physical Exam  Vitals reviewed.    Constitutional:       General: She is in acute distress.      Appearance: She is well-developed. She is ill-appearing. She is not diaphoretic.   HENT:      Head: Normocephalic and atraumatic.      Right Ear: External ear normal.      Left Ear: External ear normal.      Mouth/Throat:      Pharynx: No oropharyngeal exudate.   Eyes:      General: No scleral icterus.        Right eye: No discharge.         Left eye: No discharge.   Neck:      Thyroid: No thyromegaly.      Vascular: No JVD.      Trachea: No tracheal deviation.      Comments: Patient does have marked goiter  Cardiovascular:      Rate and Rhythm: Normal rate. Rhythm irregular.      Heart sounds: Normal heart sounds.     No friction rub. No gallop.      Comments: Pacemaker pocket nonerythematous  Pulmonary:      Effort: Pulmonary effort is normal. No respiratory distress.      Breath sounds: Normal breath sounds. No stridor. No wheezing or rales.   Chest:      Chest wall: No tenderness.   Abdominal:      General: Bowel sounds are normal. There is no distension.      Palpations: Abdomen is soft. There is no mass.      Tenderness: There is no abdominal tenderness. There is no guarding or rebound.   Genitourinary:     Comments: [place] with dark cloudy urine  Musculoskeletal:         General: No tenderness. Normal range of motion.      Cervical back: Normal range of motion and neck supple.      Comments: No evidence of muscle rigidity   Lymphadenopathy:      Cervical: No cervical adenopathy.   Skin:     General: Skin is warm and dry.      Findings: No erythema or rash.   Neurological:      General: No focal deficit present.      Mental Status: She is oriented to person, place, and time.      Cranial Nerves: No cranial nerve deficit.      Motor: No abnormal muscle tone.      Coordination: Coordination normal.   Psychiatric:         Mood and Affect: Affect is labile.      Comments: Patient not oriented agitated       Fluids    Intake/Output Summary (Last 24  hours) at 8/31/2022 1823  Last data filed at 8/31/2022 1300  Gross per 24 hour   Intake 600 ml   Output 1200 ml   Net -600 ml       Laboratory  Recent Labs     08/30/22  0115 08/30/22  1255 08/31/22  0623   WBC 12.3* 13.3* 11.1*   RBC 5.14 4.93 4.97   HEMOGLOBIN 15.2 14.8 14.8   HEMATOCRIT 44.3 42.3 43.2   MCV 86.2 85.8 86.9   MCH 29.6 30.0 29.8   MCHC 34.3 35.0 34.3   RDW 39.5 39.4 41.1   PLATELETCT 439 436 402   MPV 9.8 9.5 9.1     Recent Labs     08/30/22  0115 08/30/22  1255 08/31/22  0623   SODIUM 140 136 140   POTASSIUM 3.2* 3.9 3.3*   CHLORIDE 102 98 101   CO2 23 22 25   GLUCOSE 109* 187* 141*   BUN 10 13 16   CREATININE 0.79 0.87 1.17   CALCIUM 10.5 10.3 10.0     Recent Labs     08/30/22  1255   APTT 30.1   INR 0.97               Imaging  DX-CHEST-PORTABLE (1 VIEW)   Final Result      1.  There is no acute pneumonia or edema.   2.  Unusual vertically oriented linear lucency in the medial right hemithorax, potentially mediastinal air albeit not in a typical location.      CT-CEREBRAL PERFUSION ANALYSIS   Final Result      1.  Cerebral blood flow less than 30% likely representing completed infarct = 0 mL.      2.  T Max more than 6 seconds likely representing combination of completed infarct and ischemia = 0 mL.      3.  Mismatched volume likely representing ischemic brain/penumbra = None      4.  Please note that the cerebral perfusion was performed on the limited brain tissue around the basal ganglia region. Infarct/ischemia outside the CT perfusion sections can be missed in this study.      CT-CTA NECK WITH & W/O-POST PROCESSING   Final Result   Addendum (preliminary) 1 of 1   Addendum: There is marked goitrous enlargement of the left lobe of the    thyroid gland with substernal extension.      Final      CT angiogram of the neck within normal limits.      CT-CTA HEAD WITH & W/O-POST PROCESS   Final Result      CT angiogram of the [place] within normal limits.      CT-HEAD W/O   Final Result       Head CT without contrast within normal limits. No evidence of acute cerebral infarction, hemorrhage or mass lesion.         EC-ECHOCARDIOGRAM COMPLETE W/O CONT   Final Result      DX-CHEST-LIMITED (1 VIEW)   Final Result         No acute cardiac or pulmonary abnormality is identified.      CT-FOREIGN FILM CAT SCAN   Final Result      OUTSIDE IMAGES-CT HEAD   Final Result      OUTSIDE IMAGES-DX CHEST   Final Result      EI-ZCMGLOV-[id number] FILM X-RAY   Final Result      MR-BRAIN-W/O    (Results Pending)        Assessment/Plan  * Sepsis associated hypotension (HCC)- (present on admission)  Assessment & Plan  This is Sepsis Present on admission  SIRS criteria identified on my evaluation include: Tachycardia, with heart rate greater than 90 BPM, Leukocytosis, with WBC greater than 12,000 and Bandemia, greater than 10% bands  Source is urinary  Sepsis protocol initiated  Fluid resuscitation ordered per protocol  Crystalloid Fluid Administration: Fluid resuscitation ordered per standard protocol - 30 mL/kg per current or ideal body weight  IV antibiotics as appropriate for source of sepsis  Reassessment: I have reassessed the patient's hemodynamic status  BCs x2   UCx sent  8/30/2022:  Discontinue ceftriaxone to start Zosyn chest x-ray presently pending lactic acid pending  8/31/2022:  -Resolved          Benzodiazepine withdrawal with perceptual disturbance (HCC)  Assessment & Plan  Patient previously had been taking clonazepam 2 mg at least daily for an unknown period of time previously.  Unclear as to whether she had elevated level of clonazepam upon initial intake patient subsequently underwent diazepam withdrawals approximately 36 hours or so during admission causing tachycardic episodes delirium hypertension.  Plan:   1.  Continue patient's home clonazepam dosing  2.  Psychiatry recommendations appreciated  Continue patient's home gabapentin dosing at a reduced dose.  Hold on alternative psychiatric medications at  present.    Metabolic encephalopathy- (present on admission)  Assessment & Plan  -CT imaging to include angiography negative at OSH for CVA/LVO/stenosis/aneurysm, will obtain MRI  -vitals and neuro checks q4h  -IVF  -Limit sedatives, attempt to minimize risk of delirium such as avoiding day time napping and promote night time sleep, frequent reorientation, monitor for constipation, remove lines/tubing not needed, avoid early lab draws and vital checks, limit polypharmacy as able, and keep close to the window  -Aspiration precautions  HIV, TSH, RPR ordered  8/30/2022:  Continue present medical therapy unclear etiology of patient's encephalopathy patient had been taking high-dose gabapentin at home 3600 mg reasons to lower dose gabapentin while hospitalized provided intramuscular diazepam as patient does not have IV access at present.  Patient does have significant psychiatric history.           Hypotension- (present on admission)  Assessment & Plan  While in ED BP trended down to the 60's. Responded to IVF bolus.  Given the hypotension and encephalopathy with somnolence   +UA, urine culture,  blood cultures, cortisol, started ceftriaxone for sepsis.  BP immediately improved with IVF bolus, mentation improved.   Cortisol checked and was low, 12. I will provide one dose of Solu-Cortef 100 mg IV but I suspect her hypotension was more from dehydration.  If any further episodes of hypotension continues with stress dose steroids.  8/30/2022:  -Resolved    Acute cystitis- (present on admission)  Assessment & Plan  IV ceftriaxone, follow-up urine and blood cultures.  Appears sepsis with urinary source causing hypotension, syncope.  8/30/2022:  Discontinue ceftriaxone initiate Zosyn.  8/31/2022:  -Resolved    Elevated troponin- (present on admission)  Assessment & Plan  Mildly elevated 22, no ongoing chest pain, had mild chest pain on arrival since resolved.  EKG shows A-V dual-paced rhythm with some inhibition  Will  trend  -Resolved    Hypokalemia- (present on admission)  Assessment & Plan  K 3.3 KCL ordered    MARCE (acute kidney injury) (HCC)- (present on admission)  Assessment & Plan  UTI + Pre-renal etiology with syncope and hypotension.    Rule out post obstruction  IVF  Renal dose meds and avoid nephrotoxins  Monitor I&O's  Resolved      TIA (transient ischemic attack)- (present on admission)  Assessment & Plan  Patient had symptoms speech difficulties reported facial droop concern for stroke at OSH CT neuroimaging to include angiography was negative for acute CVA/LVO/aneurysm/significant stenosis.  MRI, however pacemaker is not compatible with MRI, will need [employer] rep to shut off device for MRI and turn on post MRI.  Monitor on telemetry  Continue statin  8/30/2022:  Patient has code stroke called today CTA imaging and perfusion scans all of which were negative.  Continue to monitor clinically.  Neurology recommendations appreciated.    Syncope- (present on admission)  Assessment & Plan  Hypotensive in ER 2/2 sepsis-urinary source.  Monitor on telemetry, check orthostatic vitals, TTE.  IV fluid  -Resolved    Paroxysmal atrial flutter (HCC)- (present on admission)  Assessment & Plan  Patient states she cannot remember the last time she took her medications, in regards to cardiac meds on her MAR metoprolol, diltiazem, digoxin, flecainide, Xarelto.  Cardiology consulted in ED and following, can discuss with them in the morning in regards to an appropriate regimen.  Check dig level.  Monitor on telemetry  8/30/2022:  Continue patient's home dosing of Xarelto continue present cardiac medications.    Mixed hyperlipidemia- (present on admission)  Assessment & Plan  Continue statin    Diabetes mellitus, type II (HCC)- (present on admission)  Assessment & Plan  Patient with a history of type 2 diabetes.  A1c 8.5.  Daughter reports that the patient is poorly controlled at home she is frequently hyperglycemic and  hypoglycemic.  At home patient is on glargine 70 units and metformin.  She initially presented hypoglycemic, metformin held and will restart the patient on Lantus but at 25 units and gradually increase.  She has been hyperglycemic.  The patient on regular insulin 6 units 3 times daily AC meals and continue the sliding scale   Diabetic diet    Bipolar affective (HCC)- (present on admission)  Assessment & Plan  Medications on MAR mirtazapine, lithium, risperidone, clonazepam, quetiapine although patient states she does not take any.  8/30/2022:  Consult with psychiatry for medication adjustment/evaluation recommendations appreciated  8/31/2022:  Continue present medical management      Sick sinus syndrome (HCC)- (present on admission)  Assessment & Plan  Status post PPM placement     Please note that this dictation was created using voice recognition software. I have made every reasonable attempt to correct obvious errors, but I expect that there are errors of grammar and possibly context that I did not discover before finalizing the note.    VTE prophylaxis: therapeutic anticoagulation with Xarelto    I have performed a physical exam and reviewed and updated ROS and Plan today (8/31/2022). In review of yesterday's note (8/30/2022), there are no changes except as documented above.

## 2022-09-01 NOTE — PROGRESS NOTES
Assumed care of PT A&O 1. Pt resting in bed with no signs of labored breathing. On RA. Tele monitor in place, cardiac rhythm being monitored. Call light within reach, bed in lowest position, upper bed rails up. Pt was updated on plan of care for the day.

## 2022-09-01 NOTE — ASSESSMENT & PLAN NOTE
Patient previously had been taking clonazepam 2 mg at least daily for an unknown period of time previously.  Unclear as to whether she had elevated level of clonazepam upon initial intake patient subsequently underwent diazepam withdrawals approximately 36 hours or so during admission causing tachycardic episodes delirium hypertension.  Plan:   1.  Continue patient's home clonazepam dosing  2.  Psychiatry recommendations appreciated  Continue patient's home gabapentin dosing at a reduced dose.  Hold on alternative psychiatric medications at present.

## 2022-09-01 NOTE — DISCHARGE INSTRUCTIONS
Sepsis, Diagnosis, Adult  Sepsis is a serious bodily reaction to an infection. The infection that triggers sepsis may be from a bacteria, virus, or fungus. Sepsis can result from an infection in any part of your body. Infections that commonly lead to sepsis include skin, lung, and urinary tract infections.  Sepsis is a medical emergency that must be treated right away in a hospital. In severe cases, it can lead to septic shock. Septic shock can weaken your heart and cause your blood pressure to drop. This can cause your central nervous system and your body's organs to stop working.  What are the causes?  This condition is caused by a severe reaction to infections from bacteria, viruses, or fungus. The germs that most often lead to sepsis include:  Escherichia coli (E. coli) bacteria.  Staphylococcus aureus (staph) bacteria.  Some types of Streptococcus bacteria.  The most common infections affect these organs:  The lung (pneumonia).  The kidneys or bladder (urinary tract infection).  The skin (cellulitis).  The bowel, gallbladder, or pancreas.  What increases the risk?  You are more likely to develop this condition if:  Your body's disease-fighting system (immune system) is weakened.  You are age 65 or older.  You are male.  You had surgery or you have been hospitalized.  You have these devices inserted into your body:  A small, thin tube (catheter).  IV line.  Breathing tube.  Drainage tube.  You are not getting enough nutrients from food (malnourished).  You have a long-term (chronic) disease, such as cancer, lung disease, kidney disease, or diabetes.  You are .  What are the signs or symptoms?  Symptoms of this condition may include:  Fever.  Chills or feeling very cold.  Confusion or anxiety.  Fatigue.  Muscle aches.  Shortness of breath.  Nausea and vomiting.  Urinating much less than usual.  Fast heart rate (tachycardia).  Rapid breathing (hyperventilation).  Changes in skin color. Your skin  may look blotchy, pale, or blue.  Cool, clammy, or sweaty skin.  Skin rash.  Other symptoms depend on the source of your infection.  How is this diagnosed?  This condition is diagnosed based on:  Your symptoms.  Your medical history.  A physical exam.  Other tests may also be done to find out the cause of the infection and how severe the sepsis is. These tests may include:  Blood tests.  Urine tests.  Swabs from other areas of your body that may have an infection. These samples may be tested (cultured) to find out what type of bacteria is causing the infection.  Chest X-ray to check for pneumonia. Other imaging tests, such as a CT scan, may also be done.  Lumbar puncture. This removes a small amount of the fluid that surrounds your brain and spinal cord. The fluid is then examined for infection.  How is this treated?  This condition must be treated in a hospital. Based on the cause of your infection, you may be given an antibiotic, antiviral, or antifungal medicine.  You may also receive:  Fluids through an IV.  Oxygen and breathing assistance.  Medicines to increase your blood pressure.  Kidney dialysis. This process cleans your blood if your kidneys have failed.  Surgery to remove infected tissue.  Blood transfusion if needed.  Medicine to prevent blood clots.  Nutrients to correct imbalances in basic body function (metabolism). You may:  Receive important salts and minerals (electrolytes) through an IV.  Have your blood sugar level adjusted.  Follow these instructions at home:  Medicines    Take over-the-counter and prescription medicines only as told by your health care provider.  If you were prescribed an antibiotic, antiviral, or antifungal medicine, take it as told by your health care provider. Do not stop taking the medicine even if you start to feel better.  General instructions  If you have a catheter or other indwelling device, ask to have it removed as soon as possible.  Keep all follow-up visits as told  by your health care provider. This is important.  Contact a health care provider if:  You do not feel like you are getting better or regaining strength.  You are having trouble coping with your recovery.  You frequently feel tired.  You feel worse or do not seem to get better after surgery.  You think you may have an infection after surgery.  Get help right away if:  You have any symptoms of sepsis.  You have difficulty breathing.  You have a rapid or skipping heartbeat.  You become confused or disoriented.  You have a high fever.  Your skin becomes blotchy, pale, or blue.  You have an infection that is getting worse or not getting better.  These symptoms may represent a serious problem that is an emergency. Do not wait to see if the symptoms will go away. Get medical help right away. Call your local emergency services (911 in the [place] Do not drive yourself to the hospital.  Summary  Sepsis is a medical emergency that requires immediate treatment in a hospital.  This condition is caused by a severe reaction to infections from bacteria, viruses, or fungus.  Based on the cause of your infection, you may be given an antibiotic, antiviral, or antifungal medicine.  Treatment may also include IV fluids, breathing assistance, and kidney dialysis.  This information is not intended to replace advice given to you by your health care provider. Make sure you discuss any questions you have with your health care provider.  Document Released: 09/15/2004 Document Revised: 07/26/2019 Document Reviewed: 07/26/2019  [employer] Patient Education © 2020 [employer]    Urinary Tract Infection, Adult  A urinary tract infection (UTI) is an infection of any part of the urinary tract. The urinary tract includes:  The kidneys.  The ureters.  The bladder.  The urethra.  These organs make, store, and get rid of pee (urine) in the body.  What are the causes?  This is caused by germs (bacteria) in your genital area. These germs grow and cause  swelling (inflammation) of your urinary tract.  What increases the risk?  You are more likely to develop this condition if:  You have a small, thin tube (catheter) to drain pee.  You cannot control when you pee or poop (incontinence).  You are female, and:  You use these methods to prevent pregnancy:  A medicine that kills sperm (spermicide).  A device that blocks sperm (diaphragm).  You have low levels of a female hormone (estrogen).  You are pregnant.  You have genes that add to your risk.  You are sexually active.  You take antibiotic medicines.  You have trouble peeing because of:  A prostate that is bigger than normal, if you are male.  A blockage in the part of your body that drains pee from the bladder (urethra).  A kidney stone.  A nerve condition that affects your bladder (neurogenic bladder).  Not getting enough to drink.  Not peeing often enough.  You have other conditions, such as:  Diabetes.  A weak disease-fighting system (immune system).  Sickle cell disease.  Gout.  Injury of the spine.  What are the signs or symptoms?  Symptoms of this condition include:  Needing to pee right away (urgently).  Peeing often.  Peeing small amounts often.  Pain or burning when peeing.  Blood in the pee.  Pee that smells bad or not like normal.  Trouble peeing.  Pee that is cloudy.  Fluid coming from the vagina, if you are female.  Pain in the belly or lower back.  Other symptoms include:  Throwing up (vomiting).  No urge to eat.  Feeling mixed up (confused).  Being tired and grouchy (irritable).  A fever.  Watery poop (diarrhea).  How is this treated?  This condition may be treated with:  Antibiotic medicine.  Other medicines.  Drinking enough water.  Follow these instructions at home:    Medicines  Take over-the-counter and prescription medicines only as told by your doctor.  If you were prescribed an antibiotic medicine, take it as told by your doctor. Do not stop taking it even if you start to feel better.  General  instructions  Make sure you:  Pee until your bladder is empty.  Do not hold pee for a long time.  Empty your bladder after sex.  Wipe from front to back after pooping if you are a female. Use each tissue one time when you wipe.  Drink enough fluid to keep your pee pale yellow.  Keep all follow-up visits as told by your doctor. This is important.  Contact a doctor if:  You do not get better after 1-2 days.  Your symptoms go away and then come back.  Get help right away if:  You have very bad back pain.  You have very bad pain in your lower belly.  You have a fever.  You are sick to your stomach (nauseous).  You are throwing up.  Summary  A urinary tract infection (UTI) is an infection of any part of the urinary tract.  This condition is caused by germs in your genital area.  There are many risk factors for a UTI. These include having a small, thin tube to drain pee and not being able to control when you pee or poop.  Treatment includes antibiotic medicines for germs.  Drink enough fluid to keep your pee pale yellow.  This information is not intended to replace advice given to you by your health care provider. Make sure you discuss any questions you have with your health care provider.  Document Released: 06/05/2009 Document Revised: 12/05/2019 Document Reviewed: 06/27/2019  [employer] Patient Education © 2020 [employer]

## 2022-09-01 NOTE — DISCHARGE PLANNING
Anticipated Discharge Dispo: Discharge Disposition: D/T to home    [address] [place] [address]  Discharge Contact Phone Number: [phone]     DME Needed: No     Action(s) Taken: Notified by Dr. Ratliff that the patient is medically cleared for discharge. Patient has transportation benefits with [facility].  RN CM called [facility] services at [phone] to schedule a ride for the patient.  GERSON LINDO spoke with Ana Rosa.  GERSON LINDO explained that the patient will be ready for discharge today, provided addresses for pickup and drop off.  Ana Rosa informed this GERSON LINDO that confirmation number for the ride will be #[id number].  Reservation is confirmed for 12 pm today.     Escalations Completed: Transportation Vendor     Medically Clear: Today     Next Steps: Case coordination to follow up with patient's transportation as needed.     Barriers to Discharge: Transportation     Is the patient up for discharge today: Yes    Is transport arranged for discharge disposition: Yes at 12 pm today

## 2022-09-01 NOTE — PROGRESS NOTES
Discharge instructions given to patient. Prescriptions given to patient. Discharge instructions given to patient at bedside, verbalizes understanding and states plans for follow-up with PCP. New and home medication review, post-discharge activity level and worsening of symptoms needing follow-up care discussed. Telemetry monitor/IV cathlon removed. All belongings accounted for, all questions answered at this time. Patient waiting for arrival of transportation.

## 2022-09-02 NOTE — HOSPITAL COURSE
Feli Vuong is a 66 y.o. female with extensive medical history to include hypertension, hyperlipidemia, paroxysmal A. Flutter, diabetes mellitus, hypertension, hyperlipidemia, bipolar affective disorder, sick sinus syndrome status post pacemaker placement who presented 8/26/2022 as a transfer from OSH for STEMI (cardiology determined no STEMI).     Patient presented to [facility] after being found on the ground syncope versus ground-level fall (story is unclear) with complaint of right hip pain.  Reports that she was having slurred speech and possible facial droop and there was concerns for stroke.  On chart review she was not reporting any chest pain on arrival to OSH.  EKG had concerns of STEMI, she was given 1 L normal saline for soft blood sugars and full dose aspirin, troponin noted to be 0.04 Pro-Jj 0.15 elevated BUN/serum creatinine.  Patient was sent to [facility] for further evaluation.  At their facility she underwent CT head without negative for acute intracranial abnormality, CTA head and neck negative for CVA, LVO, aneurysm.  Right hip x-ray negative for acute fracture or dislocation.  EKG AV dual paced rhythm, no STEMI.   Patient hypotensive and UA +, diagnosed with sepsis.  BCs, UCx sent, on Rocephin IV.  Responding to IVFs.  MARCE improving and BP improved.  Patient c/o dysuria at home, lives alone.  Given solumedrol for acute stress related adrenal crises.

## 2022-09-02 NOTE — DISCHARGE SUMMARY
Discharge Summary    CHIEF COMPLAINT ON ADMISSION  Chief Complaint   Patient presents with    Irregular Heart Beat     Transfer from [facility] at home c/c hip pain, EMS with facial droop resolved and -stroke, at hosp abnormal EKG with inf/lateral STEMI and increased Trop, +blood thinners, +pacemaker       Reason for Admission  Stemi     Admission Date  8/26/2022    CODE STATUS  Prior    HPI & HOSPITAL COURSE  This is a 66 y.o. female here with palpitations  Feli Vuong is a 66 y.o. female with extensive medical history to include hypertension, hyperlipidemia, paroxysmal A. Flutter, diabetes mellitus, hypertension, hyperlipidemia, bipolar affective disorder, sick sinus syndrome status post pacemaker placement who presented 8/26/2022 as a transfer from OSH for STEMI (cardiology determined no STEMI).     Patient presented to [facility] after being found on the ground syncope versus ground-level fall (story is unclear) with complaint of right hip pain.  Reports that she was having slurred speech and possible facial droop and there was concerns for stroke.  On chart review she was not reporting any chest pain on arrival to OSH.  EKG had concerns of STEMI, she was given 1 L normal saline for soft blood sugars and full dose aspirin, troponin noted to be 0.04 Pro-Jj 0.15 elevated BUN/serum creatinine.  Patient was sent to [facility] for further evaluation.  At their facility she underwent CT head without negative for acute intracranial abnormality, CTA head and neck negative for CVA, LVO, aneurysm.  Right hip x-ray negative for acute fracture or dislocation.  EKG AV dual paced rhythm, no STEMI.   Patient hypotensive and UA +, diagnosed with sepsis.  BCs, UCx sent, on Rocephin IV.  Responding to IVFs.  MARCE improving and BP improved.  Patient c/o dysuria at home, lives alone.  Given solumedrol for acute stress related adrenal crises.  Furthermore during hospitalization was found the patient in started to  undergo clonazepam withdrawal symptomatology including hypertension tachycardia and delirium.  This was resolved with reinstitution of patient's home medication of clonazepam in addition to reintroduction of patient's home medication and gabapentin.  On day of discharge patient was lucid and interactive and alert and oriented x3 patient was able to tell me her address 1 while she was here.  Patient was subsequently provided with transportation to her home.  Furthermore pharmacy graciously confirmed patient's home medications and that she had adequate supply of said medications.  Patient was evaluated at least twice for the possibility of acute stroke imaging and work-up revealed no evidence of acute cerebrovascular accident.  Lastly, patient will continue to take her Xarelto which also to was confirmed that she had adequate supply of at home she will continue on Xarelto 20 mg daily for treatment of atrial fibrillation.  Therefore, she is discharged in good and stable condition to home with close outpatient follow-up.    The patient met 2-midnight criteria for an inpatient stay at the time of discharge.    Discharge Date  9/1/2022    FOLLOW UP ITEMS POST DISCHARGE  Take all medication as prescribed  Go to all follow-up appointments as indicated    DISCHARGE DIAGNOSES  Principal Problem:    Sepsis associated hypotension (HCC) POA: Yes  Active Problems:    Sick sinus syndrome (HCC) POA: Yes    Bipolar affective (HCC) POA: Yes    Diabetes mellitus, type II (HCC) POA: Yes    Mixed hyperlipidemia POA: Yes    Paroxysmal atrial flutter (HCC) POA: Yes    Syncope POA: Yes    TIA (transient ischemic attack) POA: Yes    MARCE (acute kidney injury) (HCC) POA: Yes    Hypokalemia POA: Yes    Elevated troponin POA: Yes    Acute cystitis POA: Yes    Hypotension POA: Yes    Metabolic encephalopathy POA: Yes    Benzodiazepine withdrawal with perceptual disturbance (HCC) POA: Unknown  Resolved Problems:    * No resolved hospital  problems. *      FOLLOW UP    Camilo Bailon M.D.  [address]  [address] [address] [phone]    Schedule an appointment as soon as possible for a visit in 1 week(s)  Hospital follow up      MEDICATIONS ON DISCHARGE     Medication List        START taking these medications        Instructions   Aspirin Low Dose 81 MG EC tablet  Start taking on: September 2, 2022  Generic drug: aspirin   Take 1 Tablet by mouth every day.  Dose: 81 mg            CONTINUE taking these medications        Instructions   atorvastatin 80 MG tablet  Commonly known as: LIPITOR   Take 80 mg by mouth every evening.  Dose: 80 mg     clonazePAM 0.5 MG Tabs  Commonly known as: KLONOPIN   Take 1 mg by mouth 2 times a day.  Dose: 1 mg     gabapentin 800 MG tablet  Commonly known as: NEURONTIN   Take 1,600 mg by mouth 2 times a day. 2 tablets = 1600 mg  Dose: 1,600 mg     insulin glargine 100 UNIT/ML Soln  Commonly known as: Lantus   Inject 70 Units under the skin every evening.  Dose: 70 Units     metFORMIN  MG Tb24  Commonly known as: GLUCOPHAGE XR   Take 1,000 mg by mouth 2 times a day. 2 tablets = 1000 mg  Dose: 1,000 mg     metoprolol  MG Tb24  Commonly known as: TOPROL XL   Take 100 mg by mouth every day.  Dose: 100 mg     omeprazole 20 MG delayed-release capsule  Commonly known as: PRILOSEC   Take 20 mg by mouth every day.  Dose: 20 mg     Xarelto 20 MG Tabs tablet  Generic drug: rivaroxaban   TAKE ONE TABLET DAILY WITH DINNER.            STOP taking these medications      digoxin 250 MCG Tabs  Commonly known as: LANOXIN     estradiol 1 MG Tabs  Commonly known as: ESTRACE     flecainide 100 MG Tabs  Commonly known as: TAMBOCOR     lisinopril 5 MG Tabs  Commonly known as: PRINIVIL     lithium carbonate (IR) 300 MG Tabs tablet     loratadine 10 MG Tabs  Commonly known as: CLARITIN     mirtazapine 15 MG Tabs  Commonly known as: Remeron     QUEtiapine 25 MG Tabs  Commonly known as: Seroquel     risperiDONE 1 MG  Tabs  Commonly known as: RISPERDAL              Allergies  Allergies   Allergen Reactions    Septra [Bactrim]     Tape     Tegretol [Carbamazepine]     Digoxin Vomiting     When taken with antibiotics         DIET  No orders of the defined types were placed in this encounter.      ACTIVITY  As tolerated.  Weight bearing as tolerated    CONSULTATIONS  Psychiatry  Neurology    PROCEDURES  None    LABORATORY  Lab Results   Component Value Date    SODIUM 135 09/01/2022    POTASSIUM 3.3 (L) 09/01/2022    CHLORIDE 102 09/01/2022    CO2 23 09/01/2022    GLUCOSE 106 (H) 09/01/2022    BUN 23 (H) 09/01/2022    CREATININE 1.13 09/01/2022        Lab Results   Component Value Date    WBC 11.9 (H) 09/01/2022    HEMOGLOBIN 13.4 09/01/2022    HEMATOCRIT 39.1 09/01/2022    PLATELETCT 356 09/01/2022      Please note that this dictation was created using voice recognition software. I have made every reasonable attempt to correct obvious errors, but I expect that there are errors of grammar and possibly context that I did not discover before finalizing the note.    Total time of the discharge process exceeds 35 minutes.